# Patient Record
Sex: FEMALE | Race: WHITE | NOT HISPANIC OR LATINO | Employment: FULL TIME | URBAN - METROPOLITAN AREA
[De-identification: names, ages, dates, MRNs, and addresses within clinical notes are randomized per-mention and may not be internally consistent; named-entity substitution may affect disease eponyms.]

---

## 2017-02-03 ENCOUNTER — OFFICE VISIT - HEALTHEAST (OUTPATIENT)
Dept: INTERNAL MEDICINE | Facility: CLINIC | Age: 42
End: 2017-02-03

## 2017-02-03 DIAGNOSIS — J02.9 SORE THROAT: ICD-10-CM

## 2017-08-14 ENCOUNTER — OFFICE VISIT - HEALTHEAST (OUTPATIENT)
Dept: INTERNAL MEDICINE | Facility: CLINIC | Age: 42
End: 2017-08-14

## 2017-08-14 DIAGNOSIS — E55.9 VITAMIN D DEFICIENCY: ICD-10-CM

## 2017-08-14 DIAGNOSIS — Z12.4 CERVICAL CANCER SCREENING: ICD-10-CM

## 2017-08-14 DIAGNOSIS — N92.0 MENORRHAGIA: ICD-10-CM

## 2017-08-14 DIAGNOSIS — Z00.00 ANNUAL PHYSICAL EXAM: ICD-10-CM

## 2017-08-14 DIAGNOSIS — F41.9 ANXIETY: ICD-10-CM

## 2017-08-14 ASSESSMENT — MIFFLIN-ST. JEOR: SCORE: 1178.96

## 2017-08-21 LAB
BKR LAB AP ABNORMAL BLEEDING: NO
BKR LAB AP BIRTH CONTROL/HORMONES: NORMAL
BKR LAB AP CERVICAL APPEARANCE: NORMAL
BKR LAB AP GYN ADEQUACY: NORMAL
BKR LAB AP GYN INTERPRETATION: NORMAL
BKR LAB AP HPV REFLEX: NORMAL
BKR LAB AP LMP: NORMAL
BKR LAB AP PATIENT STATUS: NORMAL
BKR LAB AP PREVIOUS ABNORMAL: NORMAL
BKR LAB AP PREVIOUS NORMAL: 2012
HIGH RISK?: NO
HPV INTERPRETATION - HISTORICAL: NORMAL
HPV INTERPRETER - HISTORICAL: NORMAL
PATH REPORT.COMMENTS IMP SPEC: NORMAL
RESULT FLAG (HE HISTORICAL CONVERSION): NORMAL

## 2017-10-21 ENCOUNTER — COMMUNICATION - HEALTHEAST (OUTPATIENT)
Dept: INTERNAL MEDICINE | Facility: CLINIC | Age: 42
End: 2017-10-21

## 2017-10-23 ENCOUNTER — COMMUNICATION - HEALTHEAST (OUTPATIENT)
Dept: INTERNAL MEDICINE | Facility: CLINIC | Age: 42
End: 2017-10-23

## 2018-01-16 ENCOUNTER — COMMUNICATION - HEALTHEAST (OUTPATIENT)
Dept: INTERNAL MEDICINE | Facility: CLINIC | Age: 43
End: 2018-01-16

## 2018-01-16 DIAGNOSIS — F41.9 ANXIETY: ICD-10-CM

## 2018-07-09 ENCOUNTER — COMMUNICATION - HEALTHEAST (OUTPATIENT)
Dept: INTERNAL MEDICINE | Facility: CLINIC | Age: 43
End: 2018-07-09

## 2018-08-06 ENCOUNTER — OFFICE VISIT - HEALTHEAST (OUTPATIENT)
Dept: INTERNAL MEDICINE | Facility: CLINIC | Age: 43
End: 2018-08-06

## 2018-08-06 DIAGNOSIS — J02.9 PHARYNGITIS: ICD-10-CM

## 2018-08-06 LAB
DEPRECATED S PYO AG THROAT QL EIA: NORMAL
MONOCYTES NFR BLD AUTO: NEGATIVE %

## 2018-08-06 ASSESSMENT — MIFFLIN-ST. JEOR: SCORE: 1172.72

## 2018-08-07 LAB — GROUP A STREP BY PCR: NORMAL

## 2018-08-08 ENCOUNTER — COMMUNICATION - HEALTHEAST (OUTPATIENT)
Dept: INTERNAL MEDICINE | Facility: CLINIC | Age: 43
End: 2018-08-08

## 2018-08-27 ENCOUNTER — COMMUNICATION - HEALTHEAST (OUTPATIENT)
Dept: INTERNAL MEDICINE | Facility: CLINIC | Age: 43
End: 2018-08-27

## 2018-08-27 DIAGNOSIS — F41.9 ANXIETY: ICD-10-CM

## 2018-09-01 ENCOUNTER — COMMUNICATION - HEALTHEAST (OUTPATIENT)
Dept: INTERNAL MEDICINE | Facility: CLINIC | Age: 43
End: 2018-09-01

## 2018-09-03 ENCOUNTER — COMMUNICATION - HEALTHEAST (OUTPATIENT)
Dept: INTERNAL MEDICINE | Facility: CLINIC | Age: 43
End: 2018-09-03

## 2019-04-11 ENCOUNTER — COMMUNICATION - HEALTHEAST (OUTPATIENT)
Dept: INTERNAL MEDICINE | Facility: CLINIC | Age: 44
End: 2019-04-11

## 2019-04-11 DIAGNOSIS — F41.9 ANXIETY: ICD-10-CM

## 2019-05-07 ENCOUNTER — RECORDS - HEALTHEAST (OUTPATIENT)
Dept: ADMINISTRATIVE | Facility: OTHER | Age: 44
End: 2019-05-07

## 2019-05-08 ENCOUNTER — OFFICE VISIT - HEALTHEAST (OUTPATIENT)
Dept: INTERNAL MEDICINE | Facility: CLINIC | Age: 44
End: 2019-05-08

## 2019-05-08 DIAGNOSIS — Z30.09 ENCOUNTER FOR OTHER GENERAL COUNSELING OR ADVICE ON CONTRACEPTION: ICD-10-CM

## 2019-05-08 DIAGNOSIS — F32.81 PMDD (PREMENSTRUAL DYSPHORIC DISORDER): ICD-10-CM

## 2019-05-08 ASSESSMENT — MIFFLIN-ST. JEOR: SCORE: 1196.87

## 2019-05-28 ENCOUNTER — COMMUNICATION - HEALTHEAST (OUTPATIENT)
Dept: ADMINISTRATIVE | Facility: CLINIC | Age: 44
End: 2019-05-28

## 2019-05-28 ENCOUNTER — AMBULATORY - HEALTHEAST (OUTPATIENT)
Dept: MIDWIFE SERVICES | Facility: CLINIC | Age: 44
End: 2019-05-28

## 2019-05-28 DIAGNOSIS — Z01.812 PRE-PROCEDURE LAB EXAM: ICD-10-CM

## 2019-06-07 ENCOUNTER — AMBULATORY - HEALTHEAST (OUTPATIENT)
Dept: LAB | Facility: CLINIC | Age: 44
End: 2019-06-07

## 2019-06-07 ENCOUNTER — OFFICE VISIT - HEALTHEAST (OUTPATIENT)
Dept: MIDWIFE SERVICES | Facility: CLINIC | Age: 44
End: 2019-06-07

## 2019-06-07 DIAGNOSIS — Z01.812 PRE-PROCEDURE LAB EXAM: ICD-10-CM

## 2019-06-07 DIAGNOSIS — Z30.430 ENCOUNTER FOR IUD INSERTION: ICD-10-CM

## 2019-06-07 DIAGNOSIS — Z30.09 ENCOUNTER FOR OTHER GENERAL COUNSELING OR ADVICE ON CONTRACEPTION: ICD-10-CM

## 2019-06-07 LAB — HCG UR QL: NEGATIVE

## 2019-06-07 ASSESSMENT — MIFFLIN-ST. JEOR: SCORE: 1195.97

## 2019-06-23 ENCOUNTER — COMMUNICATION - HEALTHEAST (OUTPATIENT)
Dept: INTERNAL MEDICINE | Facility: CLINIC | Age: 44
End: 2019-06-23

## 2019-06-24 ENCOUNTER — COMMUNICATION - HEALTHEAST (OUTPATIENT)
Dept: ADMINISTRATIVE | Facility: CLINIC | Age: 44
End: 2019-06-24

## 2019-06-24 DIAGNOSIS — N93.9 VAGINAL BLEEDING: ICD-10-CM

## 2019-06-25 ENCOUNTER — COMMUNICATION - HEALTHEAST (OUTPATIENT)
Dept: MIDWIFE SERVICES | Facility: CLINIC | Age: 44
End: 2019-06-25

## 2019-06-26 ENCOUNTER — COMMUNICATION - HEALTHEAST (OUTPATIENT)
Dept: OBGYN | Facility: CLINIC | Age: 44
End: 2019-06-26

## 2019-07-01 ENCOUNTER — COMMUNICATION - HEALTHEAST (OUTPATIENT)
Dept: OBGYN | Facility: CLINIC | Age: 44
End: 2019-07-01

## 2019-07-16 ENCOUNTER — COMMUNICATION - HEALTHEAST (OUTPATIENT)
Dept: MIDWIFE SERVICES | Facility: CLINIC | Age: 44
End: 2019-07-16

## 2019-10-14 ENCOUNTER — COMMUNICATION - HEALTHEAST (OUTPATIENT)
Dept: INTERNAL MEDICINE | Facility: CLINIC | Age: 44
End: 2019-10-14

## 2019-10-14 DIAGNOSIS — F41.9 ANXIETY: ICD-10-CM

## 2020-01-03 ENCOUNTER — COMMUNICATION - HEALTHEAST (OUTPATIENT)
Dept: INTERNAL MEDICINE | Facility: CLINIC | Age: 45
End: 2020-01-03

## 2020-01-03 ENCOUNTER — OFFICE VISIT - HEALTHEAST (OUTPATIENT)
Dept: INTERNAL MEDICINE | Facility: CLINIC | Age: 45
End: 2020-01-03

## 2020-01-03 DIAGNOSIS — F34.0 CYCLOTHYMIC DISORDER: ICD-10-CM

## 2020-01-03 DIAGNOSIS — Z23 NEED FOR VACCINATION: ICD-10-CM

## 2020-01-03 DIAGNOSIS — R53.83 FATIGUE, UNSPECIFIED TYPE: ICD-10-CM

## 2020-01-03 LAB
ANION GAP SERPL CALCULATED.3IONS-SCNC: 8 MMOL/L (ref 5–18)
BASOPHILS # BLD AUTO: 0 THOU/UL (ref 0–0.2)
BASOPHILS NFR BLD AUTO: 0 % (ref 0–2)
BUN SERPL-MCNC: 12 MG/DL (ref 8–22)
CALCIUM SERPL-MCNC: 9.2 MG/DL (ref 8.5–10.5)
CHLORIDE BLD-SCNC: 108 MMOL/L (ref 98–107)
CO2 SERPL-SCNC: 26 MMOL/L (ref 22–31)
CREAT SERPL-MCNC: 0.7 MG/DL (ref 0.6–1.1)
EOSINOPHIL # BLD AUTO: 0.1 THOU/UL (ref 0–0.4)
EOSINOPHIL NFR BLD AUTO: 2 % (ref 0–6)
ERYTHROCYTE [DISTWIDTH] IN BLOOD BY AUTOMATED COUNT: 10.6 % (ref 11–14.5)
GFR SERPL CREATININE-BSD FRML MDRD: >60 ML/MIN/1.73M2
GLUCOSE BLD-MCNC: 86 MG/DL (ref 70–125)
HCT VFR BLD AUTO: 39.9 % (ref 35–47)
HGB BLD-MCNC: 13.4 G/DL (ref 12–16)
LYMPHOCYTES # BLD AUTO: 1.6 THOU/UL (ref 0.8–4.4)
LYMPHOCYTES NFR BLD AUTO: 28 % (ref 20–40)
MCH RBC QN AUTO: 31.4 PG (ref 27–34)
MCHC RBC AUTO-ENTMCNC: 33.6 G/DL (ref 32–36)
MCV RBC AUTO: 94 FL (ref 80–100)
MONOCYTES # BLD AUTO: 0.4 THOU/UL (ref 0–0.9)
MONOCYTES NFR BLD AUTO: 6 % (ref 2–10)
NEUTROPHILS # BLD AUTO: 3.7 THOU/UL (ref 2–7.7)
NEUTROPHILS NFR BLD AUTO: 64 % (ref 50–70)
PLATELET # BLD AUTO: 201 THOU/UL (ref 140–440)
PMV BLD AUTO: 6.9 FL (ref 7–10)
POTASSIUM BLD-SCNC: 4.2 MMOL/L (ref 3.5–5)
RBC # BLD AUTO: 4.26 MILL/UL (ref 3.8–5.4)
SODIUM SERPL-SCNC: 142 MMOL/L (ref 136–145)
TSH SERPL DL<=0.005 MIU/L-ACNC: 0.91 UIU/ML (ref 0.3–5)
WBC: 5.8 THOU/UL (ref 4–11)

## 2020-01-03 ASSESSMENT — ANXIETY QUESTIONNAIRES
IF YOU CHECKED OFF ANY PROBLEMS ON THIS QUESTIONNAIRE, HOW DIFFICULT HAVE THESE PROBLEMS MADE IT FOR YOU TO DO YOUR WORK, TAKE CARE OF THINGS AT HOME, OR GET ALONG WITH OTHER PEOPLE: SOMEWHAT DIFFICULT
4. TROUBLE RELAXING: NEARLY EVERY DAY
5. BEING SO RESTLESS THAT IT IS HARD TO SIT STILL: MORE THAN HALF THE DAYS
7. FEELING AFRAID AS IF SOMETHING AWFUL MIGHT HAPPEN: SEVERAL DAYS
3. WORRYING TOO MUCH ABOUT DIFFERENT THINGS: SEVERAL DAYS
1. FEELING NERVOUS, ANXIOUS, OR ON EDGE: MORE THAN HALF THE DAYS
GAD7 TOTAL SCORE: 11
GAD7 TOTAL SCORE: 11
7. FEELING AFRAID AS IF SOMETHING AWFUL MIGHT HAPPEN: SEVERAL DAYS
2. NOT BEING ABLE TO STOP OR CONTROL WORRYING: SEVERAL DAYS
1. FEELING NERVOUS, ANXIOUS, OR ON EDGE: MORE THAN HALF THE DAYS
6. BECOMING EASILY ANNOYED OR IRRITABLE: SEVERAL DAYS
6. BECOMING EASILY ANNOYED OR IRRITABLE: SEVERAL DAYS
4. TROUBLE RELAXING: NEARLY EVERY DAY
5. BEING SO RESTLESS THAT IT IS HARD TO SIT STILL: MORE THAN HALF THE DAYS
3. WORRYING TOO MUCH ABOUT DIFFERENT THINGS: SEVERAL DAYS
2. NOT BEING ABLE TO STOP OR CONTROL WORRYING: SEVERAL DAYS

## 2020-01-03 ASSESSMENT — MIFFLIN-ST. JEOR: SCORE: 1201.92

## 2020-01-03 ASSESSMENT — PATIENT HEALTH QUESTIONNAIRE - PHQ9: SUM OF ALL RESPONSES TO PHQ QUESTIONS 1-9: 5

## 2020-01-20 ENCOUNTER — COMMUNICATION - HEALTHEAST (OUTPATIENT)
Dept: INTERNAL MEDICINE | Facility: CLINIC | Age: 45
End: 2020-01-20

## 2020-01-27 ENCOUNTER — COMMUNICATION - HEALTHEAST (OUTPATIENT)
Dept: INTERNAL MEDICINE | Facility: CLINIC | Age: 45
End: 2020-01-27

## 2020-01-27 DIAGNOSIS — F41.9 ANXIETY: ICD-10-CM

## 2020-07-01 ENCOUNTER — COMMUNICATION - HEALTHEAST (OUTPATIENT)
Dept: INTERNAL MEDICINE | Facility: CLINIC | Age: 45
End: 2020-07-01

## 2020-07-01 DIAGNOSIS — F41.9 ANXIETY: ICD-10-CM

## 2020-07-22 ENCOUNTER — OFFICE VISIT - HEALTHEAST (OUTPATIENT)
Dept: INTERNAL MEDICINE | Facility: CLINIC | Age: 45
End: 2020-07-22

## 2020-07-22 DIAGNOSIS — F31.81 MILD HYPOMANIC BIPOLAR II DISORDER (H): ICD-10-CM

## 2020-07-22 DIAGNOSIS — Z12.31 ENCOUNTER FOR SCREENING MAMMOGRAM FOR BREAST CANCER: ICD-10-CM

## 2020-07-22 RX ORDER — BUSPIRONE HYDROCHLORIDE 7.5 MG/1
7.5 TABLET ORAL PRN
Status: SHIPPED | COMMUNITY
Start: 2020-07-22 | End: 2022-05-26

## 2020-10-09 ENCOUNTER — RECORDS - HEALTHEAST (OUTPATIENT)
Dept: MAMMOGRAPHY | Facility: CLINIC | Age: 45
End: 2020-10-09

## 2020-10-09 DIAGNOSIS — Z12.31 ENCOUNTER FOR SCREENING MAMMOGRAM FOR MALIGNANT NEOPLASM OF BREAST: ICD-10-CM

## 2020-12-29 ENCOUNTER — COMMUNICATION - HEALTHEAST (OUTPATIENT)
Dept: INTERNAL MEDICINE | Facility: CLINIC | Age: 45
End: 2020-12-29

## 2020-12-29 DIAGNOSIS — F41.9 ANXIETY: ICD-10-CM

## 2020-12-30 RX ORDER — LORAZEPAM 0.5 MG/1
0.5 TABLET ORAL DAILY
Qty: 20 TABLET | Refills: 0 | Status: SHIPPED | OUTPATIENT
Start: 2020-12-30 | End: 2021-09-14

## 2021-01-04 ENCOUNTER — COMMUNICATION - HEALTHEAST (OUTPATIENT)
Dept: INTERNAL MEDICINE | Facility: CLINIC | Age: 46
End: 2021-01-04

## 2021-01-04 DIAGNOSIS — F31.81 MILD HYPOMANIC BIPOLAR II DISORDER (H): ICD-10-CM

## 2021-01-05 RX ORDER — TRAZODONE HYDROCHLORIDE 50 MG/1
TABLET, FILM COATED ORAL
Qty: 90 TABLET | Refills: 1 | Status: SHIPPED | OUTPATIENT
Start: 2021-01-05 | End: 2021-07-13

## 2021-03-11 ENCOUNTER — COMMUNICATION - HEALTHEAST (OUTPATIENT)
Dept: INTERNAL MEDICINE | Facility: CLINIC | Age: 46
End: 2021-03-11

## 2021-03-11 DIAGNOSIS — F31.81 MILD HYPOMANIC BIPOLAR II DISORDER (H): ICD-10-CM

## 2021-03-15 ENCOUNTER — AMBULATORY - HEALTHEAST (OUTPATIENT)
Dept: LAB | Facility: CLINIC | Age: 46
End: 2021-03-15

## 2021-03-15 DIAGNOSIS — F34.1 PERSISTENT DEPRESSIVE DISORDER: ICD-10-CM

## 2021-03-17 ENCOUNTER — AMBULATORY - HEALTHEAST (OUTPATIENT)
Dept: LAB | Facility: CLINIC | Age: 46
End: 2021-03-17

## 2021-03-17 DIAGNOSIS — F31.81 MILD HYPOMANIC BIPOLAR II DISORDER (H): ICD-10-CM

## 2021-03-17 DIAGNOSIS — F34.1 PERSISTENT DEPRESSIVE DISORDER: ICD-10-CM

## 2021-03-17 LAB
ALBUMIN SERPL-MCNC: 4 G/DL (ref 3.5–5)
ALP SERPL-CCNC: 61 U/L (ref 45–120)
ALT SERPL W P-5'-P-CCNC: 10 U/L (ref 0–45)
AST SERPL W P-5'-P-CCNC: 16 U/L (ref 0–40)
BASOPHILS # BLD AUTO: 0 THOU/UL (ref 0–0.2)
BASOPHILS NFR BLD AUTO: 0 % (ref 0–2)
BILIRUB DIRECT SERPL-MCNC: 0.2 MG/DL
BILIRUB SERPL-MCNC: 0.8 MG/DL (ref 0–1)
CHOLEST SERPL-MCNC: 195 MG/DL
EOSINOPHIL # BLD AUTO: 0.1 THOU/UL (ref 0–0.4)
EOSINOPHIL NFR BLD AUTO: 2 % (ref 0–6)
ERYTHROCYTE [DISTWIDTH] IN BLOOD BY AUTOMATED COUNT: 12.6 % (ref 11–14.5)
FASTING STATUS PATIENT QL REPORTED: YES
HCT VFR BLD AUTO: 39.8 % (ref 35–47)
HDLC SERPL-MCNC: 60 MG/DL
HGB BLD-MCNC: 12.9 G/DL (ref 12–16)
IMM GRANULOCYTES # BLD: 0 THOU/UL
IMM GRANULOCYTES NFR BLD: 0 %
LDLC SERPL CALC-MCNC: 122 MG/DL
LYMPHOCYTES # BLD AUTO: 1.8 THOU/UL (ref 0.8–4.4)
LYMPHOCYTES NFR BLD AUTO: 34 % (ref 20–40)
MCH RBC QN AUTO: 30.6 PG (ref 27–34)
MCHC RBC AUTO-ENTMCNC: 32.4 G/DL (ref 32–36)
MCV RBC AUTO: 94 FL (ref 80–100)
MONOCYTES # BLD AUTO: 0.5 THOU/UL (ref 0–0.9)
MONOCYTES NFR BLD AUTO: 9 % (ref 2–10)
NEUTROPHILS # BLD AUTO: 3 THOU/UL (ref 2–7.7)
NEUTROPHILS NFR BLD AUTO: 55 % (ref 50–70)
PLATELET # BLD AUTO: 220 THOU/UL (ref 140–440)
PMV BLD AUTO: 8.8 FL (ref 7–10)
PROT SERPL-MCNC: 6.5 G/DL (ref 6–8)
RBC # BLD AUTO: 4.22 MILL/UL (ref 3.8–5.4)
TRIGL SERPL-MCNC: 67 MG/DL
WBC: 5.4 THOU/UL (ref 4–11)

## 2021-03-19 LAB — LAMOTRIGINE SERPL-MCNC: 2.3 UG/ML (ref 2.5–15)

## 2021-03-22 ENCOUNTER — COMMUNICATION - HEALTHEAST (OUTPATIENT)
Dept: LAB | Facility: CLINIC | Age: 46
End: 2021-03-22

## 2021-04-02 ENCOUNTER — RECORDS - HEALTHEAST (OUTPATIENT)
Dept: ADMINISTRATIVE | Facility: OTHER | Age: 46
End: 2021-04-02

## 2021-05-27 ASSESSMENT — PATIENT HEALTH QUESTIONNAIRE - PHQ9: SUM OF ALL RESPONSES TO PHQ QUESTIONS 1-9: 5

## 2021-05-27 NOTE — TELEPHONE ENCOUNTER
Controlled Substance Refill Request  Medication:   Requested Prescriptions     Pending Prescriptions Disp Refills     LORazepam (ATIVAN) 0.5 MG tablet 20 tablet 0     Sig: Take 1 tablet (0.5 mg total) by mouth daily.     Date Last Fill: 8/29/18  [id number]   Submit electronically to pharmacy  Controlled Substance Agreement on File:   Encounter-Level CSA Scan Date:    There are no encounter-level csa scan date.       Last office visit: Last office visit pertaining to requested medication was 8/6/18.

## 2021-05-28 ASSESSMENT — ANXIETY QUESTIONNAIRES: GAD7 TOTAL SCORE: 11

## 2021-05-28 NOTE — PROGRESS NOTES
[facility] Follow Up Note    Assessment/Plan:    1. Encounter for other general counseling or advice on contraception  Patient is being .  She has a new sexual partner.  Both of them have been tested for STDs and negative.  In the past she had fertility problems and had fertility treatments however is hard to  if she might be more fertile with a new sexual partner.  Initially she was interested in nonhormonal birth control and we discussed copper IUD.  She was also interested in progesterone only IUD.  We will refer her to midwife to further address that.  She did have normal Pap smear negative HPV in 2017.  - Ambulatory referral to Midwifery    2.  Anxiety and depression.  Currently patient has been in better spirits and has been exercising and seeing therapist which has been controlling her symptoms.  She uses lorazepam infrequently for very extremely stressful situations only.    Susan Guerra MD    Chief Complaint:  Chief Complaint   Patient presents with     Annual Exam       History of Present Illness:    Rangel is a 44 y.o. female with history of anxiety and depression, PMS and possible PCOS who is currently here to discuss birth control.    Patient is currently in the process of divorce and has a news partner.  In the past she did have difficulty conceiving her both children and did require fertility drugs as well as metformin.  She has not been using birth control when sexually active with her  and has not gotten pregnant.  Currently she has new sexual partner and is more sexually active.  They both discussed tested for STDs prior to onset of the relationship.  Her periods have been fairly regular and flow has been moderate.  Overall it is hard to predict what her fertility status is giving a new sexual partner.  Patient was interested in nonhormonal treatments would be mechanical and copper IUD.  We also talked about progesterone only IUD which might be a good solution  "for her because it will be good for 5 years and then she can have it removed at 49.  She was interested in seeing midwife to further address this.    Mood has been well.  Patient has been exercising a lot and seeing a therapist and denies worsening depressive symptoms.  If anything process of divorce has been positively impacted her.  She does use lorazepam occasionally as needed for very stressful situations but has not been using it consistently.    Review of Systems:  A comprehensive review of systems was performed and was otherwise negative    PFSH:  Social History: Reviewed  Social History     Tobacco Use   Smoking Status Never Smoker   Smokeless Tobacco Never Used     Social History     Social History Narrative    Patient is  but is having some marital problems and currently is going to do couples therapy.  She works as an  for sixth grade.  She has 2 children born in 2006 and 2009.       Past History: Reviewed  Current Outpatient Medications   Medication Sig Dispense Refill     LORazepam (ATIVAN) 0.5 MG tablet Take 1 tablet (0.5 mg total) by mouth daily. 20 tablet 0     No current facility-administered medications for this visit.        Family History: Reviewed    Physical Exam:    Vitals:    05/08/19 1538   BP: 104/60   Patient Site: Left Arm   Patient Position: Sitting   Cuff Size: Adult Regular   Pulse: 60   SpO2: 100%   Weight: 125 lb 3.2 oz (56.8 kg)   Height: 5' 4.25\" (1.632 m)     Wt Readings from Last 3 Encounters:   05/08/19 125 lb 3.2 oz (56.8 kg)   08/06/18 119 lb (54 kg)   08/14/17 120 lb 6 oz (54.6 kg)     Body mass index is 21.32 kg/m .    Constitutional:  Reveals a pleasant young female.  Vitals:  Per nursing notes.  HEENT:No cervical LAD, no thyromegaly,  conjunctiva is pink, no scleral icterus, TMs are visualized and normal bl, oropharynx is clear, no exudates,   Cardiac:  Regular rate and rhythm,no murmurs, rubs, or gallops. Carotids without bruits. Legs without edema. " Palpation of the radial pulse regular.  Lungs: Clear to auscultation bl.  Respiratory effort normal.  Abdomen:positive BS, soft, nontender, nondistended.  No hepato-splenomagaly  Skin:   Without rash, bruise, or palpable lesions.  Rheumatologic: Normal joints and nails of the hands.  Neurologic:  Cranial nerves II-XII intact.     Psychiatric: affect appropriate, memory intact.       Data Review:    Analysis and Summary of Old Records (2): yes      Records Requested (1): no      Other History Summarized (from other people in the room) (2): no    Radiology Tests Summarized (XRAY/CT/MRI/DXA) (1): no    Labs Reviewed (1): yes    Medicine Tests Reviewed (EKG/ECHO/COLONOSCOPY/EGD) (1): no    Independent Review of EKG or X-RAY (2): no

## 2021-05-29 ENCOUNTER — RECORDS - HEALTHEAST (OUTPATIENT)
Dept: ADMINISTRATIVE | Facility: CLINIC | Age: 46
End: 2021-05-29

## 2021-05-29 NOTE — TELEPHONE ENCOUNTER
Telephone Call with on Call CNM    Assessment:   Mirena IUD in place  Heavy period    Plan:  -Reviewed bleeding profile of Mirena IUD.  Discussed IUD is likely causing a heavier than normal period  -Reviewed possible malposition of IUD as cause of heavy bleeding  -Recommend checking strings.  If longer or shorter than previously noted or no strings palpated, clinic visit and US are indicated  -Discussed options: close monitoring of bleeding, clinic visit or ultrasound  -Recommend confirming placement with ultrasound for reassurance in addition to close monitoring of bleeding.  Order placed at [facility] per pt preference  -Recommend not relying on IUD as contraception until location confirmed  -Warning signs reviewed and when to call/seek emergent care.     Subjective:  Mirena IUD placed June 7th.  Spotted for 4-5  days.  Then stopped for 2 days.  Period started, spotting x 2 days.  Then medium to heavy bleeding x 9 days.  Filling menstrual cup in 4 hours, which is typical for her heaviest day of her period.   Mild cramping.  Burnett x 1 since placement; no discomfort.  Feels that yesterday, she would have soaked a pad in an hour.  Today there is less bleeding~ a tablespoon in Diva cup about every 3 hours.   She has not felt the strings since the first week; will plan to do so.  Denies fever, abdominal pain, dizziness.    Typical periods are 7 days in duration--light days, medium days, and 2 heavier days.    She will monitor her bleeding and also plan to have ultrasound in a few days.    Ying OBREGON, SUPRIYA

## 2021-05-29 NOTE — TELEPHONE ENCOUNTER
Name of caller: Patient  Phone number where you may be reached: [phone]  Reason for call: Pt had IUD placed on 6/7 and had a few days of light spotting but since then has had very heavy, prolonged bleeding. Pt thinks it's about 9 days straight. She would like a call back from a midwife to discuss if she needs to be seen in clinic.  Best time to call back: any  If we don't reach you, is it okay to leave a detailed message? yes

## 2021-05-29 NOTE — PATIENT INSTRUCTIONS - HE
no method of birth control, except abstinence, is 100% effective against pregnancy or kana sexually transmitted diseases, including Human Immunodeficiency Virus (HIV) infection that leads to the Acquired Immunodeficiency Syndrome (AIDS) disease.     BENEFITS: The IUD is 97-99% effective if all the directions regarding its use are followed carfeully. It can be more effective if used with foam or condoms mid-cycle between periods. IUDs containing progestin may decrease menstrual flow and painful menstrual periods. The IUD provides longer protection from pregnancy (Paragard- 10 years; Mirena - 5 years)   RISKS/ SIDE EFFECTS   1. Spotting, bleeding, hemorrhage or anemia   2. Cramping or pain   3. Partial or complete expulsion of device leading to pregnancy, the pregnancy ending in miscarriage   4. Lost IUD string or other string problems   5. Puncturing of the uterus, embedding, or cervical perforation   6. Increased risk of pelvic inflammatory disease   WARNING SIGNS: The patient was advised to call the clinic if she has any of the following early warning signs:   P - Period late (pregnancy), abnormal spotting or bleeding   A - Abdominal pain, pain with intercourse   I - Infection exposure (such as gonorrhea), abnormal discharge   N - Not feeling well, fever, chills   S - String missing, shorter or longer   Check for the IUD strings several times during the first few months after insertion and then after each monthly period or before intercourse.   DECISION TO DISCONTINUE USE: You may have the IUD removed at any time. Do not try to remove the device- it should be removed only by a medical provider. If you do not desire to become pregnant, you may request to have another IUD inserted or choose to use another method of birth control.

## 2021-05-29 NOTE — PROGRESS NOTES
ASSESSMENT:      44 y.o.,   Routine Mirena IUD insertion.         PLAN:     1.  Reviewed, witnessed and signed IUD insertion consent form and reviewed alternative options for BCM available. Patient desires Mirena IUD insertion today.   2. Reassurance re: correct placement, normalcy of side effects and that these often lessen with extended use of IUD.   3.  Recommended taking ibuprofen 600 mg to reduce amount and duration of bleeding.    4.  Taught and encouraged to check IUD strings monthly.    5.  Warning signs related to IUD use (PAINS) and when to call CNM reviewed  including to call with any fever, severe back pain, severe abdominal pain, heavy bleeding (soaking more than 1 pad per hour)  6. Return to clinic in 4 weeks for IUD string check, sooner as needed.         SUBJECTIVE:      Rangel Salamanca is a 44 y.o. female who presents for IUD insertion. The patient has no complaints today. The patient is sexually active.     HPI/ROS:   Rangel Salamanca is a 44 y.o. female who presents for IUD insert. LMP 19.  History of infertility, but would like effective contraception as not wanting to risk pregnancy.     Urine pregnancy test was performed in clinic and was negative. Undecided initially about IUD type: has mood disorder which was worsened by , but also has a history of heavy menstrual bleeding. Pt education included mechanism of action, benefits and drawbacks of Mirena and Paragard IUDs. The risks and benefits of the procedure were explained as below to the patient and informed consent was obtained and consent form signed.  Rangel would like to proceed with Mireuna IUD insertion.  Rangel was given an opportunity to ask questions about all forms of birth control, meaning all prescriptions, non-prescription, and natural methods. All of her questions were answered to her satisfaction and she understood all of those answers. She understands that no method of birth control, except abstinence, is  100% effective against pregnancy or kana sexually transmitted diseases, including Human Immunodeficiency Virus (HIV) infection that leads to the Acquired Immunodeficiency Syndrome (AIDS) disease. The following benefits, risk/side effects, warning signs, control method, intrauterine decisions to discontinue use option, regarding the birth control method, intrauterine device, were explained to her before she voluntarily decided to use this method of birth control.   BENEFITS: The IUD is 97-99% effective if all the directions regarding its use are followed carfeully. It can be more effective if used with foam or condoms mid-cycle between periods. IUDs containing progestin may decrease menstrual flow and painful menstrual periods. The IUD provides longer protection from pregnancy (Paragard- 10 years; Mirena - 5 years)   RISKS/ SIDE EFFECTS   1. Spotting, bleeding, hemorrhage or anemia   2. Cramping or pain   3. Partial or complete expulsion of device leading to pregnancy, the pregnancy ending in miscarriage   4. Lost IUD string or other string problems   5. Puncturing of the uterus, embedding, or cervical perforation   6. Increased risk of pelvic inflammatory disease   WARNING SIGNS: The patient was advised to call the clinic if she has any of the following early warning signs:   P - Period late (pregnancy), abnormal spotting or bleeding   A - Abdominal pain, pain with intercourse   I - Infection exposure (such as gonorrhea), abnormal discharge   N - Not feeling well, fever, chills   S - String missing, shorter or longer   ALTERNATIVES: She received written information about other methods of birth control and she chose the IUD. She understands that she should check for the IUD strings several times during the first few months after insertion and then after each monthly period or before intercourse.   DECISION TO DISCONTINUE USE: She understands that she may have the IUD removed at any time. She knows not try to  remove the device and that it should be removed only by a medical provider. If she does not desire to become pregnant, she has been told she may request to have another IUD inserted or choose to use another method of birth control. If she wishes to become pregnant, she understands most women not using birth control become pregnant within 12 months.    Review of Systems  Pertinent items are noted in HPI.     OBJECTIVE:     Vitals:    06/07/19 1441   BP: 108/62   Pulse: 68       IUD Insertion Procedure Note    Pre-operative Diagnosis: Desires Mirena IUD Contraception    Post-operative Diagnosis: same    Indications: Contraception    Procedure Details   Bimanual exam performed revealing an anteverted uterus, midline, non-tender, negative CMT. Cervix is multiparous, long, thick, closed. Sterile speculum placed. GC/CT not collected as patient reports recent negative testing and does not want to repeat today- understands collecting can help prevent infection and resulting complications.  Cervix and vaginal walls cleansed with Betadine. Tenaculum placed on cervix at 11 o'clock and 2 o'clock. Uterus sounded to 8 cm. IUD inserted without difficulty. String visible and trimmed to 3 cm. Tenaculum removed. Minimal bleeding noted, well controlled with brennan swabs. Patient tolerated procedure well. Did not have any periods of syncope, sat up and ambulated with ease.     IUD Information:  Mirena.  Lot # [id number]  Exp 4/2021    Condition:  Stable    Complications:  None        Total time with patient 40 min > 50% time spent in counseling or coordination of care.    Fadia Ortiz, APRN, CNM, CLC

## 2021-05-30 VITALS — BODY MASS INDEX: 20.4 KG/M2 | WEIGHT: 120.7 LBS

## 2021-05-30 NOTE — TELEPHONE ENCOUNTER
Telephone Call with on Call CNM  Reviewed ultrasound results from 6/25 with patient.  IUD is in the correct place in the uterus, which is reassuring.  Also reviewed the finding of a fibroid in the uterus.    Her bleeding today is lighter than it has been.  Wearing a panitliner.    Encouraged to follow up for 4-6 week string check and call CNM service as needed.    SABAS Luis, CNM

## 2021-05-30 NOTE — TELEPHONE ENCOUNTER
"PHONE NOTE: Patient called CN on-call with complaint of heavy bleeding with IUD.  Patient had her IUD placed on 6/7/2019.  Then she started her period on 6/14/2019.  She had fairly heavy bleeding and then it has continued.  She states out of 4 stars she is now up to so it has eased up a little bit.  But she is concerned that she is on day 17 of bleeding today.  I recommended 600 mg of ibuprofen  every 6 hours for 3 days.  If the bleeding does not improve, she should call back.  Patient had an ultrasound and the imaging shows that the IUD is in proper placement.  Patient is not feeling dizzy or lightheaded.  She states \"I will eat high iron food, some meat, and take a high iron vitamin \".  Patient knows when to call/come in and has phone numbers.  "

## 2021-05-30 NOTE — TELEPHONE ENCOUNTER
"TELEPHONE CALL:  Called patient and LVM.  I see that she is on my schedule for this afternoon due to concerns of \"prolonged bleeding\" with Mirena that was placed on 6/7.  Appt notes also state \"replace if needed.\"  I explained on VM that most beneficial would be having the ultrasound before appt, as I would not pull IUD unless part of it was visually seen.  I would wait for u/s to verify whether it was in the correct position or not.    "

## 2021-05-31 ENCOUNTER — RECORDS - HEALTHEAST (OUTPATIENT)
Dept: ADMINISTRATIVE | Facility: CLINIC | Age: 46
End: 2021-05-31

## 2021-05-31 VITALS — BODY MASS INDEX: 20.06 KG/M2 | WEIGHT: 120.38 LBS | HEIGHT: 65 IN

## 2021-06-01 VITALS — WEIGHT: 119 LBS | HEIGHT: 65 IN | BODY MASS INDEX: 19.83 KG/M2

## 2021-06-02 NOTE — TELEPHONE ENCOUNTER
Controlled Substance Refill Request  Medication:   Requested Prescriptions     Pending Prescriptions Disp Refills     LORazepam (ATIVAN) 0.5 MG tablet 20 tablet 0     Sig: Take 1 tablet (0.5 mg total) by mouth daily.     Date Last Fill: 4.12.19  Pharmacy: [facility]   Submit electronically to pharmacy  Controlled Substance Agreement on File:   Encounter-Level CSA Scan Date:    There are no encounter-level csa scan date.       Last office visit: Last office visit pertaining to requested medication was 5.8.19.

## 2021-06-03 VITALS — HEIGHT: 64 IN | BODY MASS INDEX: 21.34 KG/M2 | WEIGHT: 125 LBS

## 2021-06-03 VITALS — BODY MASS INDEX: 21.37 KG/M2 | WEIGHT: 125.2 LBS | HEIGHT: 64 IN

## 2021-06-04 VITALS
BODY MASS INDEX: 20.9 KG/M2 | SYSTOLIC BLOOD PRESSURE: 92 MMHG | DIASTOLIC BLOOD PRESSURE: 64 MMHG | WEIGHT: 125.44 LBS | OXYGEN SATURATION: 98 % | HEART RATE: 60 BPM | HEIGHT: 65 IN

## 2021-06-04 NOTE — PROGRESS NOTES
[facility] Follow Up Note    Assessment/Plan:    1. Cyclothymic disorder  Patient does have cycling of depressive symptoms and hypomania.  No full-blown bipolar disorder.  Cyclothymic diagnosis was given to her by psychiatrist 2 years ago.  She did not start on Lamictal as recommended at that time.  Currently she has lorazepam but does not want to use it too often.  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  She anticipates that with resuming of school next week her symptoms will get better.  Currently discussed that she can take Lamictal at bedtime as needed.  Since she does have symptoms of depression 10 or more days a month I think starting on Lamictal might be helpful but I asked her to wait until she is in a more depressed mood before starting it.  Also recommended that she schedules a follow-up with her psychiatrist.  - QUEtiapine (SEROQUEL) 100 MG tablet; 1-2 tabs at bedtime as needed  Dispense: 60 tablet; Refill: 0  - lamoTRIgine (LAMICTAL) 25 MG tablet; Take one tab by mouth daily for 2 weeks, then 2 tabs for 2 weeks, then 4 tabs daily  Dispense: 120 tablet; Refill: 2  - HM1(CBC and Differential)  - Thyroid Stimulating Hormone (TSH)  - Basic Metabolic Panel  - HM1 (CBC with Diff)    2. Fatigue, unspecified type  - HM1(CBC and Differential)  - Thyroid Stimulating Hormone (TSH)  - Basic Metabolic Panel  - HM1 (CBC with Diff)    3. Need for vaccination  - Td, Preservative Free (green label)      Susan Guerra MD    Chief Complaint:  Chief Complaint   Patient presents with     Annual Exam     Medication Management       History of Present Illness:  Rangel is a 44 y.o. female  with history of anxiety and depression, PMS and possible PCOS who is currently here to discuss  symptoms and treatment for cyclothymia.    I last saw the patient over a year ago and at that time she was going through divorce and has had some depression and anxiety.  She has been using lorazepam very infrequently.  Since then she reports that she did see a psychiatrist who gave her diagnosis of cyclothymia since she did have fluctuating symptoms of being hyper and being depressed.  He did offer to try Lamictal but patient did not want to do it.  She works as a teacher and normally she does well do do it during school time but when there is a vacation no break and days unstructured her mood is all over the place.    Currently she complains about somewhat hyper and having problems sleeping.  She has been using shot of whiskey to help her sleep.  She has not been overusing lorazepam but on average takes it 3 times a week.  She does get about 6-7 hours a night of sleep.  Melatonin has not been helping.  On PHQ 9 her mood currently is good however based on her mood tracker it appears that 10 to 14 days a month she does feel more depressed.  She has been exercising but it does not help her sleep.  Overall her mood cycles weekly.    Her new boyfriend currently also has bipolar disorder but is on medications.  She currently has IUD for birth control.  Before they started their relationship they both had STD screenings.    Patient is amiable to get her tetanus and flu shot today.    Review of Systems:  A comprehensive review of systems was performed and was otherwise negative    PFSH:  Social History: Reviewed  Social History     Tobacco Use   Smoking Status Never Smoker   Smokeless Tobacco Never Used     Social History     Social History Narrative    Patient is  but is having some marital problems and currently is going to do couples therapy.  She works as an  for sixth grade.  She has 2 children born in 2006 and 2009.       Past History: Viewed  Current Outpatient Medications   Medication Sig  "Dispense Refill     LORazepam (ATIVAN) 0.5 MG tablet Take 1 tablet (0.5 mg total) by mouth daily. 20 tablet 0     lamoTRIgine (LAMICTAL) 25 MG tablet Take one tab by mouth daily for 2 weeks, then 2 tabs for 2 weeks, then 4 tabs daily 120 tablet 2     QUEtiapine (SEROQUEL) 100 MG tablet 1-2 tabs at bedtime as needed 60 tablet 0     No current facility-administered medications for this visit.        Family History: Viewed    Physical Exam:    Vitals:    01/03/20 1226   BP: 92/64   Patient Site: Left Arm   Patient Position: Sitting   Cuff Size: Adult Regular   Pulse: 60   SpO2: 98%   Weight: 125 lb 7 oz (56.9 kg)   Height: 5' 4.5\" (1.638 m)     Wt Readings from Last 3 Encounters:   01/03/20 125 lb 7 oz (56.9 kg)   06/07/19 125 lb (56.7 kg)   05/08/19 125 lb 3.2 oz (56.8 kg)     Body mass index is 21.2 kg/m .    Constitutional:  Reveals a pleasant young female.  Vitals:  Per nursing notes.  HEENT:No cervical LAD, no thyromegaly,  conjunctiva is pink, no scleral icterus, TMs are visualized and normal bl, oropharynx is clear, no exudates,   Cardiac:  Regular rate and rhythm,no murmurs, rubs, or gallops. Carotids without bruits. Legs without edema. Palpation of the radial pulse regular.  Lungs: Clear to auscultation bl.  Respiratory effort normal.  Abdomen:positive BS, soft, nontender, nondistended.  No hepato-splenomagaly  Skin:   Without rash, bruise, or palpable lesions.  Rheumatologic: Normal joints and nails of the hands.  Neurologic:  Cranial nerves II-XII intact.     Psychiatric: affect appropriate, memory intact.  No flight of ideas or pressured speech.  Affect is slightly flat, good concentration.    Data Review:    Analysis and Summary of Old Records (2): yes      Records Requested (1): no      Other History Summarized (from other people in the room) (2): no    Radiology Tests Summarized (XRAY/CT/MRI/DXA) (1): no    Labs Reviewed (1): yes    Medicine Tests Reviewed (EKG/ECHO/COLONOSCOPY/EGD) (1): " no    Independent Review of EKG or X-RAY (2): no

## 2021-06-04 NOTE — PATIENT INSTRUCTIONS - HE
1. Try Seroquel at bed time to help with sleep as needed.  Start Lamictal to help stabilize mood and prevent depression.   Follow up with psychiatrist.

## 2021-06-05 NOTE — TELEPHONE ENCOUNTER
Medication: Lorazepam  Last Date Filled 10/17/19   pulled: YES    Only PCP Prescribing? : YES  Taken as prescribed from physician notes? YES    CSA in last year: YES  Random Utox in last year: NO  (if any of the above answer NO - schedule with PCP)     Opioids + benzodiazepines? YES  (if the above answer YES - schedule with PCP every 6 months)     Is patient on the Executive Review Team? no      All responses suggest: Refilling prescription

## 2021-06-05 NOTE — TELEPHONE ENCOUNTER
Controlled Substance Refill Request  Medication Name:   Requested Prescriptions     Pending Prescriptions Disp Refills     LORazepam (ATIVAN) 0.5 MG tablet 20 tablet 0     Sig: Take 1 tablet (0.5 mg total) by mouth daily.     Date Last Fill: 10/17/19  Requested Pharmacy: [facility]  Submit electronically to pharmacy  Controlled Substance Agreement on file:   Encounter-Level CSA Scan Date:    There are no encounter-level csa scan date.        Last office visit:  1/3/20

## 2021-06-08 NOTE — PROGRESS NOTES
[facility] Follow Up Note    Assessment/Plan:  1. Sore throat-strep pharyngitis  Recommendations: Fluids, Tylenol for aches and pains.  Amoxicillin 875 twice a day for 10 days.  - Rapid Strep A Screen-Throat        Heavenly Moser MD    Chief Complaint:  Chief Complaint   Patient presents with     Sore Throat     Ear Pain       History of Present Illness:  Rangel is a 41 y.o. female who is here today for evaluation of severe left ear pain and a sore throat.  She states that she developed a viral URI about 2-1/2 weeks ago.  It consisted of a cough, congestion, aches and pains.  The symptoms went away and she was starting to feel better.  Today she awoke with severe left ear and throat pain.  She is here for an evaluation.  She denies any nausea or vomiting.  She denies any high fever.  She denies any skin rashes.  She is a .  She has young children at home that have had respiratory viruses.    Review of Systems:  A comprehensive review of systems was performed and was otherwise negative    PFSH:  Social History: She is a .  She is a nonsmoker  History   Smoking Status     Never Smoker   Smokeless Tobacco     Never Used       Past History: Unremarkable  Current Outpatient Prescriptions   Medication Sig Dispense Refill     clindamycin-benzoyl peroxide (BENZACLIN) gel Apply and gently massage into affected area(s). 25 g 0     amoxicillin (AMOXIL) 875 MG tablet Take 1 tablet (875 mg total) by mouth 2 (two) times a day for 10 days. 20 tablet 0     No current facility-administered medications for this visit.        Family History: No one with strep at home    Physical Exam:  General Appearance:   She appears mildly uncomfortable  Vitals:    02/03/17 0838   BP: 100/56   Patient Site: Left Arm   Patient Position: Sitting   Cuff Size: Adult Regular   Pulse: 66   Temp: 98.4  F (36.9  C)   TempSrc: Tympanic   Weight: 120 lb 11.2 oz (54.7 kg)     Wt Readings from Last 3  Encounters:   02/03/17 120 lb 11.2 oz (54.7 kg)   03/28/16 117 lb (53.1 kg)   08/05/15 115 lb 11.2 oz (52.5 kg)     Body mass index is 20.4 kg/(m^2).    Head and neck exam is performed.  Pupils are equal and react to light.  Ears are intact bilaterally.  Sinuses and nose are unremarkable.  Throat reveals mild erythema  Neck is palpated.  There is very tender anterior lymph node  Lungs are clear to auscultation and percussion  Cardiac exam reveals regular rate and rhythm with no murmurs or gallops  Abdomen is soft and nontender with no splenomegaly

## 2021-06-09 NOTE — TELEPHONE ENCOUNTER
Medication: Lorazepam  Last Date Filled 1/29/20   pulled: YES    Only PCP Prescribing? : YES  Taken as prescribed from physician notes? YES    CSA in last year: YES  Random Utox in last year: NO  (if any of the above answer NO - schedule with PCP)     Opioids + benzodiazepines? YES  (if the above answer YES - schedule with PCP every 6 months)     Is patient on the Executive Review Team? no      All responses suggest: Refilling prescription

## 2021-06-09 NOTE — PROGRESS NOTES
Patient would like to be contacted via the following phone number [phone]  ASSESSMENT:    1. Mild hypomanic bipolar II disorder (H)  Patient tried Seroquel and Lamictal together and developed twitching and stopped them.  Currently she is anxious about side effect from this medications.  She feels that she is able to control her sleep her hypomanic symptoms during the day are well managed.  She does see a psychiatrist but reports is hard to get in with him.  Most recently she was given prescription for trazodone and uses it as needed as well as Benadryl.  She does get prescription for lorazepam for me.  She only uses it twice a week or less for extreme anxiety and hyperactivity.  She also has prescription for buspirone but denies significant effect from that.  - traZODone (DESYREL) 50 MG tablet; 1-2 tbs at bedtime as needed  Dispense: 30 tablet; Refill: 0    2. Encounter for screening mammogram for breast cancer  - Mammo Screening Bilateral; Future        CHIEF COMPLAINT:  Chief Complaint   Patient presents with     Follow-up       HISTORY OF PRESENT ILLNESS:  Rangel is a 45 y.o. female contacting the clinic today via phone for follow-up.  Rangel has h/o  with history of anxiety and depression, PMS and possible PCOS.    I last saw her in January.  At that time due to hypomania and cycling depression I gave her prescription for Seroquel and Lamictal.  She started them all at once and developed twitching, got anxious and discontinued.  Since then she was able to see her psychiatrist.  He gave her trazodone to use at bedtime to improve her sleep.  She uses it as needed.  She also gets prescription for lorazepam from me but only uses it twice a week or less for anxiety and intense hyperactivity.    REVIEW OF SYSTEMS:   All other systems are negative.    PFSH:  Social History     Social History Narrative    Patient is  but is having some marital problems and currently is going to do couples therapy.  She works  "as an  for sixth grade.  She has 2 children born in 2006 and 2009.       TOBACCO USE:  Social History     Tobacco Use   Smoking Status Never Smoker   Smokeless Tobacco Never Used       VITALS:  There were no vitals filed for this visit.  Wt Readings from Last 3 Encounters:   01/03/20 125 lb 7 oz (56.9 kg)   06/07/19 125 lb (56.7 kg)   05/08/19 125 lb 3.2 oz (56.8 kg)       PHYSICAL EXAM:  (observations via Phone)  Pleasant female, no acute distress, awake alert and oriented x3, no flight of ideas or pressured speech.  Thought processes linear.      ADDITIONAL HISTORY SUMMARIZED (2): Chart reviewed  CARE EVERYWHERE/ EXTRA INFORMATION (1):   RADIOLOGY TESTS (1): No  LABS (1): No  CARDIOLOGY/MEDICINE TESTS (1): No  INDEPENDENT REVIEW (2 each):     Total data points: 2    Phone Start time: 3:24 PM  Phone End time: 3:34 PM    The visit lasted a total of 10 minutes     CA Intake Time: 4 min    I have reviewed and updated the patient's Past Medical History, Social History, Family History as appropriate.    MEDICATIONS: Reviewed and updated per CA and MD  Current Outpatient Medications   Medication Sig Dispense Refill     lamoTRIgine (LAMICTAL) 25 MG tablet Take one tab by mouth daily for 2 weeks, then 2 tabs for 2 weeks, then 4 tabs daily 120 tablet 2     LORazepam (ATIVAN) 0.5 MG tablet Take 1 tablet (0.5 mg total) by mouth daily. 20 tablet 0     QUEtiapine (SEROQUEL) 100 MG tablet 1-2 tabs at bedtime as needed 60 tablet 0     No current facility-administered medications for this visit.        The patient has been notified of following:     \"This telephone visit will be conducted via a call between you and your physician/provider. We have found that certain health care needs can be provided without the need for a physical exam.  This service lets us provide the care you need with a short phone conversation.  If a prescription is necessary we can send it directly to your pharmacy.  If lab work is needed we " "can place an order for that and you can then stop by our lab to have the test done at a later time.    Telephone visits are billed at different rates depending on your insurance coverage. During this emergency period, for some insurers they may be billed the same as an in-person visit.  Please reach out to your insurance provider with any questions.    If during the course of the call the physician/provider feels a telephone visit is not appropriate, you will not be charged for this service.\"    Patient has given verbal consent to a Telephone visit? Yes    What phone number would you like to be contacted at? [phone]    Patient would like to receive their AVS by AVS Preference: [place].      Susan Guerra MD       "

## 2021-06-09 NOTE — TELEPHONE ENCOUNTER
Controlled Substance Refill Request  Medication Name:   Requested Prescriptions     Pending Prescriptions Disp Refills     LORazepam (ATIVAN) 0.5 MG tablet 20 tablet 0     Sig: Take 1 tablet (0.5 mg total) by mouth daily.     Date Last Fill: 1/29/20  Requested Pharmacy: [facility]  Submit electronically to pharmacy  Controlled Substance Agreement on file:   Encounter-Level CSA Scan Date:    There are no encounter-level csa scan date.        Last office visit:  1/3/20

## 2021-06-12 NOTE — PROGRESS NOTES
[facility] Follow Up Note    Assessment/Plan:    1. Annual physical exam  Pap smear was obtained today.  Patient will come back for fasting blood work.  She had normal mammogram last year and we discussed that we can repeat it in a year or 2.  Patient will get flu shot in October.  - Gynecologic Cytology (PAP Smear)     2. Anxiety  Lorazepam was refilled.  She uses it infrequently (last year 14 tablets lasted her almost a year).  Discussed if symptoms get worse we can try Prozac.  - Thyroid Stimulating Hormone (TSH); Future  - LORazepam (ATIVAN) 0.5 MG tablet; Take 1 tablet (0.5 mg total) by mouth daily.  Dispense: 20 tablet; Refill: 0    3. Menorrhagia  We will screen for anemia.  - HM1(CBC and Differential); Future  - Iron and Transferrin Iron Binding Capacity; Future  - Thyroid Stimulating Hormone (TSH); Future    4. Vitamin D deficiency  - Vitamin D, Total (25-Hydroxy); Future    Susan Guerra MD    Chief Complaint:  Chief Complaint   Patient presents with     Establish Care     Change from Dr Chung     Annual Exam       History of Present Illness:  Rangel is a 42 y.o. female with history of anxiety and occasional depression, PMS, questionable PCO S who is currently here to establish care and have physical exam done.    Patient has long-standing history of anxiety and occasional depression.  Currently she is seeing therapist and also does yoga and exercise to help with symptoms.  She had some marital problems and is going into couples therapy at the moment.  In the past there are several antidepressants that were tried: Wellbutrin caused insomnia, citalopram help with anxiety but make her very tired, Effexor made her suicidal.  Currently she is requesting prescription for lorazepam.  She did bring her previous bottle of 14 tablets of Lorazepam which was ordered in February 2016 by Dr. Chung and she still has 1 pill left.  She is aware that if she takes this medication regular rate could be  addictive but she just wants to have a few tablets on hold.   was reviewed.    Patient was also diagnosed with PCO S due to history of infertility.  She did not have any regular symptoms of PCO S and was menstruating but could not get pregnant.  Her fertility doctor put her on metformin and Clomid and since then she has had 2 children.  Currently her menstruations are regular but menstrual cycle is shorter and she does have increased menstrual bleeding.  She denies any severe abdominal cramping or pain with intercourse.  She is due for Pap smear today.  She denies any vaginal itching or discharge.  No history of abnormal Pap smears in the past.  She declined STD screening.    Review of Systems:  A comprehensive review of systems was performed and was otherwise negative.  Patient denies any panic attacks.  Occasionally sleep is interrupted.  No history of asthma shortness of breath.  She runs and does yoga for exercise and denies any dizziness, shortness of breath or chest pains.  No abdominal complaints.  No urinary complaints.    PFSH:  Social History: Reviewed  History   Smoking Status     Never Smoker   Smokeless Tobacco     Never Used     Social History     Social History Narrative    Patient is  but is having some marital problems and currently is going to do couples therapy.  She works as an  for sixth grade.  She has 2 children born in 2006 and 2009.       Past History: Reviewed  Current Outpatient Prescriptions   Medication Sig Dispense Refill     clindamycin-benzoyl peroxide (BENZACLIN) gel Apply and gently massage into affected area(s). 25 g 0     LORazepam (ATIVAN) 0.5 MG tablet Take 1 tablet (0.5 mg total) by mouth daily. 20 tablet 0     No current facility-administered medications for this visit.        Family History: Reviewed    Physical Exam:    Vitals:    08/14/17 1206   BP: 98/60   Patient Site: Left Arm   Patient Position: Sitting   Cuff Size: Adult Regular   Pulse: 68  "  Resp: 16   SpO2: 99%   Weight: 120 lb 6 oz (54.6 kg)   Height: 5' 4.5\" (1.638 m)     Wt Readings from Last 3 Encounters:   08/14/17 120 lb 6 oz (54.6 kg)   02/03/17 120 lb 11.2 oz (54.7 kg)   03/28/16 117 lb (53.1 kg)     Body mass index is 20.34 kg/(m^2).    Constitutional:  Reveals a pleasant young female.  Vitals:  Per nursing notes.  HEENT:No cervical LAD, no thyromegaly,  conjunctiva is pink, no scleral icterus, TMs are visualized and normal bl, oropharynx is clear, no exudates,   Cardiac:  Regular rate and rhythm,no murmurs, rubs, or gallops.  Legs without edema. Palpation of the radial pulse regular.  Lungs: Clear to auscultation bl.  Respiratory effort normal.  Abdomen:positive BS, soft, nontender, nondistended.  No hepato-splenomagaly  Skin:   Without rash, bruise, or palpable lesions.  Rheumatologic: Normal joints and nails of the hands.  Psychiatric: affect appropriate, memory intact.   Breast exam: No axillary lymphadenopathy, breast masses or skin changes appreciated.  Breast is slightly fibrocystic.  Gyn: normal external genitalia, cervix with slight transitional zone irregularity, but no polyps or friability, no masses on bymanual exam.    Data Review:    Analysis and Summary of Old Records (2): Yes    Records Requested (1): No    Other History Summarized (from other people in the room) (2): No    Radiology Tests Summarized (XRAY/CT/MRI/DXA) (1): No    Labs Reviewed (1): Yes    Medicine Tests Reviewed (EKG/ECHO/COLONOSCOPY/EGD) (1): No    Independent Review of EKG or X-RAY (2): No      "

## 2021-06-14 NOTE — TELEPHONE ENCOUNTER
Prescription Monitoring Program activity reviewed with no discrepancies noted.      Last fill per : 7/20/20  Quantity/days supply: 20 tablets for 20 days     Controlled Substance Agreement on file: Yes  Date: 1/30/20    Last office visit with provider:  1/3/2020    Please advise.

## 2021-06-14 NOTE — TELEPHONE ENCOUNTER
Refill Approved    Rx renewed per Medication Renewal Policy. Medication was last renewed on 20.    Veronica Gallo, [facility] Triage/Med Refill 2021     Requested Prescriptions   Pending Prescriptions Disp Refills     traZODone (DESYREL) 50 MG tablet 30 tablet 0     Si-2 tbs at bedtime as needed       Tricyclics/Misc Antidepressant/Antianxiety Meds Refill Protocol Passed - 2021  4:11 PM        Passed - PCP or prescribing provider visit in last year     Last office visit with prescriber/PCP: 2018 Susan Guerra MD OR same dept: Visit date not found OR same specialty: 2018 Susan Guerra MD  Last physical: 1/3/2020 Last MTM visit: Visit date not found   Next visit within 3 mo: Visit date not found  Next physical within 3 mo: Visit date not found  Prescriber OR PCP: Susan Guerra MD  Last diagnosis associated with med order: 1. Mild hypomanic bipolar II disorder (H)  - traZODone (DESYREL) 50 MG tablet; 1-2 tbs at bedtime as needed  Dispense: 30 tablet; Refill: 0    If protocol passes may refill for 12 months if within 3 months of last provider visit (or a total of 15 months).

## 2021-06-14 NOTE — TELEPHONE ENCOUNTER
Controlled Substance Refill Request  Medication Name:   Requested Prescriptions     Pending Prescriptions Disp Refills     LORazepam (ATIVAN) 0.5 MG tablet 20 tablet 0     Sig: Take 1 tablet (0.5 mg total) by mouth daily.     Date Last Fill: 7/7/20  [facility]  Submit electronically to pharmacy  Controlled Substance Agreement on file:   Encounter-Level CSA Scan Date:    There are no encounter-level csa scan date.        Last office visit:  7/22/20

## 2021-06-15 NOTE — TELEPHONE ENCOUNTER
Received a message from Rangel that she would like a lab appointment to monitor limotrigine levels. Please enter orders.

## 2021-06-16 PROBLEM — Z30.430 ENCOUNTER FOR IUD INSERTION: Status: ACTIVE | Noted: 2019-06-07

## 2021-06-16 PROBLEM — Z30.09 ENCOUNTER FOR OTHER GENERAL COUNSELING OR ADVICE ON CONTRACEPTION: Status: ACTIVE | Noted: 2019-06-07

## 2021-06-16 PROBLEM — D25.9 FIBROID UTERUS: Status: ACTIVE | Noted: 2019-06-26

## 2021-06-16 PROBLEM — F31.81: Status: ACTIVE | Noted: 2018-08-06

## 2021-06-16 NOTE — TELEPHONE ENCOUNTER
Telephone Encounter by Court Gaona CMA at 1/28/2020 12:56 PM     Author: Court Gaona CMA Service: -- Author Type: Certified Medical Assistant    Filed: 1/28/2020 12:58 PM Encounter Date: 1/27/2020 Status: Signed    : Court Gaona CMA (Certified Medical Assistant)

## 2021-06-16 NOTE — TELEPHONE ENCOUNTER
Telephone Encounter by Estela Garza CMA at 4/12/2019  2:45 PM     Author: Estela Garza CMA Service: -- Author Type: Certified Medical Assistant    Filed: 4/12/2019  2:51 PM Encounter Date: 4/11/2019 Status: Signed    : Estela Garza CMA (Certified Medical Assistant)       Medication: lorazepam  Last Date Filled: 8/29/2018   pulled: YES            Only PCP Prescribing? : YES  Taken as prescribed from physician notes? YES    CSA in last year: N/A  Random Utox in last year: N/A    Your note dated 8/14/2017 you stated the following :     2. Anxiety  Lorazepam was refilled.  She uses it infrequently (last year 14 tablets lasted her almost a year).  Discussed if symptoms get worse we can try Prozac.  - Thyroid Stimulating Hormone (TSH); Future  - LORazepam (ATIVAN) 0.5 MG tablet; Take 1 tablet (0.5 mg total) by mouth daily.  Dispense: 20 tablet; Refill: 0      (if any of the above answer NO - schedule with PCP)     Opioids + benzodiazepines? NO  (if the above answer YES - schedule with PCP every 6 months)     All responses suggest: Refilling prescription

## 2021-06-16 NOTE — TELEPHONE ENCOUNTER
Telephone Encounter by Odilia Arndt LPN at 10/17/2019  9:17 AM     Author: Odilia Arndt LPN Service: -- Author Type: Licensed Nurse    Filed: 10/17/2019  9:21 AM Encounter Date: 10/14/2019 Status: Signed    : Odilia Arndt LPN (Licensed Nurse)       Medication: Lorazepam  Last Date Filled 4/12/19   pulled: YES         Only PCP Prescribing? : YES  Taken as prescribed from physician notes? YES OV 5/8/19    2.  Anxiety and depression.  Currently patient has been in better spirits and has been exercising and seeing therapist which has been controlling her symptoms.  She uses lorazepam infrequently for very extremely stressful situations only.       CSA in last year: NO  Random Utox in last year: NO  (if any of the above answer NO - schedule with PCP)     Opioids + benzodiazepines? NO  (if the above answer YES - schedule with PCP every 6 months)     Is patient on the Executive Review Team? No      All responses suggest: Refilling prescription

## 2021-06-16 NOTE — TELEPHONE ENCOUNTER
Pt already had labs done on March 17th.  Please see why she is coming back. If psychiatrist wants mre labs, he/she should order it.     Previously she requested that labs are faxed to her psychiatrist: Fax labs to [phone] [facility].

## 2021-06-17 NOTE — TELEPHONE ENCOUNTER
Telephone Encounter by Court Gaona CMA at 7/2/2020 10:21 AM     Author: Court Gaona CMA Service: -- Author Type: Certified Medical Assistant    Filed: 7/2/2020 10:23 AM Encounter Date: 7/1/2020 Status: Signed    : Court Gaona CMA (Certified Medical Assistant)

## 2021-06-19 NOTE — PROGRESS NOTES
[facility] Follow Up Note    Assessment/Plan:    1. Pharyngitis  Rapid strep test was negative.  Will check a mono screen as well.  If both are negative I would empirically treat her for strep.  But will await final results.  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  - Rapid Strep A Screen-Throat  - Mononucleosis Screen  - Group A Strep, RNA Direct Detection, Throat      Susan Guerra MD    Chief Complaint:  Chief Complaint   Patient presents with     Sore Throat       History of Present Illness:  Rangel is a 43 y.o. female with history of anxiety and occasional depression, PMS, questionable PCO S who is currently here for acute visit due to sore throat.    She had sore throat for 3 days and denies any other symptoms (no postnasal drainage or cough).  She had mild pain in her left ear and increased fatigue.  She went camping last Wednesday with a friend but he was not sick.  Currently she is concerned because there is mild white exudate on her tonsils bilaterally.    Review of Systems:  A comprehensive review of systems was performed and was otherwise negative.  Patient is currently followed by psychiatrist and was diagnosed with his cyclothymic disorder.  She did go on Prozac for treatment of depression which gave her josee.  Currently she is followed with his therapist and was started on Lamictal by psychiatrist but has not started taking it yet.  Melatonin has been helpful for her to regulate her sleep.    PFSH:  Social History: Reviewed  History   Smoking Status     Never Smoker   Smokeless Tobacco     Never Used     Social History     Social History Narrative    Patient is  but is having some marital problems and currently is going to do couples therapy.  She works as an  for sixth grade.  She has 2 children born in 2006 and 2009.       Past History: Reviewed  Current Outpatient Prescriptions   Medication Sig Dispense Refill     LORazepam  "(ATIVAN) 0.5 MG tablet Take 1 tablet (0.5 mg total) by mouth daily. 20 tablet 0     No current facility-administered medications for this visit.        Family History: Reviewed    Physical Exam:    Vitals:    08/06/18 1113   BP: 92/60   Patient Site: Left Arm   Patient Position: Sitting   Cuff Size: Adult Regular   Pulse: (!) 58   Resp: 16   Temp: 99.1  F (37.3  C)   TempSrc: Tympanic   SpO2: 97%   Weight: 119 lb (54 kg)   Height: 5' 4.5\" (1.638 m)     Wt Readings from Last 3 Encounters:   08/06/18 119 lb (54 kg)   08/14/17 120 lb 6 oz (54.6 kg)   02/03/17 120 lb 11.2 oz (54.7 kg)     Body mass index is 20.11 kg/(m^2).    Constitutional:  Reveals a pleasant young female.  Vitals:  Per nursing notes.  HEENT: Mild anterior cervical cervical LAD, no thyromegaly,  conjunctiva is pink, no scleral icterus, TMs are visualized and normal bl, oropharynx is clear, no exudates,   Cardiac:  Regular rate and rhythm,no murmurs, rubs, or gallops.  Legs without edema. Palpation of the radial pulse regular.  Lungs: Clear to auscultation bl.  Respiratory effort normal.  No wheezes or rales  Abdomen:positive BS, soft, nontender, nondistended.  Mild splenomagaly  Skin:   Without rash, bruise, or palpable lesions.  Rheumatologic: Normal joints and nails of the hands.  Neurologic:  Cranial nerves II-XII intact.     Psychiatric: affect appropriate, memory intact.     Data Review:    Analysis and Summary of Old Records (2): Yes    Records Requested (1): No    Other History Summarized (from other people in the room) (2): No    Radiology Tests Summarized (XRAY/CT/MRI/DXA) (1): No    Labs Reviewed (1): No    Medicine Tests Reviewed (EKG/ECHO/COLONOSCOPY/EGD) (1): No    Independent Review of EKG or X-RAY (2): No    "

## 2021-06-20 NOTE — LETTER
Letter by Susan Guerra MD at      Author: Susan Guerra MD Service: -- Author Type: --    Filed:  Encounter Date: 1/3/2020 Status: Signed         [facility]  01/03/20    Patient: Rangel Salamanca  YOB: 1975  Medical Record Number: [id number]  CSN: [id number]                                                                              Non-opioid Controlled Substance Agreement    I understand that my care provider has prescribed a controlled substance to help manage my condition(s). I am taking this medicine to help me function or work. I know this is strong medicine, and that it can cause serious side effects. Controlled substances can be sedating, addicting and may cause a dependency on the drug. They can affect my ability to drive or think, and cause depression. They need to be taken exactly as prescribed. Combining controlled substances with certain medicines or chemicals (such as cocaine, sedatives and tranquilizers, sleeping pills, meth) can be dangerous or even fatal. Also, if I stop controlled substances suddenly, I may have severe withdrawal symptoms.  If not helpful, I may be asked to stop them.    The risks, benefits, and side effects of these medicine(s) were explained to me. I agree that:    1. I will take part in other treatments as advised by my care team. This may be psychiatry or counseling, physical therapy, behavioral therapy, group treatment or a referral to a pain clinic. I will reduce or stop my medicine when my care team tells me to do so.  2. I will take my medicines as prescribed. I will not change the dose or schedule unless my care team tells me to. There will be no refills if I run out early.  I may be contactedwithout warning and asked to complete a urine drug test or pill count at any time.   3. I will keep all my appointments, and understand this is part of the monitoring of controlled substances. My care team may require an office visit for EVERY  controlled substance refill. If I miss appointments or dont follow instructions, my care team may stop my medicine.  4. I will not ask other providers to prescribe controlled substances, and I will not accept controlled substances from other people. If I need another prescribed controlled substance for a new reason, I will tell my care team within 1 business day.  5. I will use one pharmacy to fill all of my controlled substance prescriptions, and it is up to me to make sure that I do not run out of my medicines on weekends or holidays. If my care team is willing to refill my controlled substance prescription without a visit, I must request refills only during office hours, refills may take up to 3 days to process, and it may take up to 5 to 7 days for my medicine to be mailed and ready at my pharmacy. Prescriptions will not be mailed anywhere except my pharmacy.    6. I am responsible for my prescriptions. If the medicine/prescription is lost or stolen, it will not be replaced. I also agree not to share controlled substance medicines with anyone.          [facility] INTER[facility]IC[facility]  01/03/20  Patient:  Rangel Salamanca  YOB: 1975  Medical Record Number: [id number]  CSN: [id number]    7. I agree to not use ANY illegal or recreational drugs. This includes marijuana, cocaine, bath salts or other drugs. I agree not to use alcohol unless my care team says I may. I agree to give urine samples whenever asked. If I dont give a urine sample, the care team may stop my medicine.    8. If I enroll in the [place] Medical Marijuana program, I will tell my care team. I will also sign an agreement to share my medical records with my care team.    9. I will bring in my list of medicines (or my medicine bottles) each time I come to the clinic.   10. I will tell my care team right away if I become pregnant or have a new medical problem treated outside of my regular clinic.  11. I understand that this medicine can affect my  thinking and judgment. It may be unsafe for me to drive, use machinery and do dangerous tasks. I will not do any of these things until I know how the medicine affects me. If my dose changes, I will wait to see how it affects me. I will contact my care team if I have concerns about medicine side effects.    I understand that if I do not follow any of the conditions above, my prescriptions or treatment may be stopped.      I agree that my provider, clinic care team, and pharmacy may work with any city, state or federal law enforcement agency that investigates the misuse, sale, or other diversion of my controlled medicine. I will allow my provider to discuss my care with or share a copy of this agreement with any other treating provider, pharmacy or emergency room where I receive care. I agree to give up (waive) any right of privacy or confidentiality with respect to these consents.   I have read this agreement and have asked questions about anything I did not understand.    ___________________________________________________________________________  Patient signature - Date/Time  -Rangel Salamanca                                      ___________________________________________________________________________  Witness signature                                                                    ___________________________________________________________________________  Provider signature- Susan Guerra MD

## 2021-06-27 ENCOUNTER — HEALTH MAINTENANCE LETTER (OUTPATIENT)
Age: 46
End: 2021-06-27

## 2021-06-30 ENCOUNTER — COMMUNICATION - HEALTHEAST (OUTPATIENT)
Dept: INTERNAL MEDICINE | Facility: CLINIC | Age: 46
End: 2021-06-30

## 2021-06-30 DIAGNOSIS — F31.81 MILD HYPOMANIC BIPOLAR II DISORDER (H): ICD-10-CM

## 2021-06-30 RX ORDER — TRAZODONE HYDROCHLORIDE 50 MG/1
TABLET, FILM COATED ORAL
Qty: 90 TABLET | Refills: 0 | Status: SHIPPED | OUTPATIENT
Start: 2021-06-30 | End: 2024-06-05

## 2021-07-03 NOTE — ADDENDUM NOTE
Addendum Note by Ashley Kang CMA at 7/7/2020  9:26 AM     Author: Ashley Kang CMA Service: -- Author Type: Certified Medical Assistant    Filed: 7/7/2020  9:26 AM Encounter Date: 7/1/2020 Status: Signed    : Ashley Kang CMA (Certified Medical Assistant)    Addended by: ASHLEY KANG on: 7/7/2020 09:26 AM        Modules accepted: Orders

## 2021-07-03 NOTE — ADDENDUM NOTE
Addendum Note by Tyra Guerra MD at 7/7/2020  3:06 PM     Author: Tyra Guerra MD Service: -- Author Type: Physician    Filed: 7/7/2020  3:06 PM Encounter Date: 7/1/2020 Status: Signed    : Tyra Guerra MD (Physician)    Addended by: TYRA GUERRA on: 7/7/2020 03:06 PM        Modules accepted: Orders

## 2021-07-04 NOTE — TELEPHONE ENCOUNTER
Telephone Encounter by Sue Mena at 7/1/2021 10:12 AM     Author: Sue Mena Service: -- Author Type: --    Filed: 7/1/2021 10:12 AM Encounter Date: 6/30/2021 Status: Signed    : Sue Mena       Left FIRST mess VV/telephone appt for med tiffanie

## 2021-07-04 NOTE — TELEPHONE ENCOUNTER
Telephone Encounter by Hoda Mix RN at 2021  7:31 PM     Author: Hoda Mix RN Service: -- Author Type: Registered Nurse    Filed: 2021  7:35 PM Encounter Date: 2021 Status: Signed    : Hoda Mix RN (Registered Nurse)       Refill Approved    Rx renewed per Medication Renewal Policy. Medication was last renewed on 2021 with 1 refill.  Last office visit: Virtual visit 2020 with PCP Dr AMY Guerra.   Due for office visit with PCP: messages sent to clinic and pharmacy.     Hoda Mix, [facility] Triage/Med Refill 2021     Requested Prescriptions   Pending Prescriptions Disp Refills   ? traZODone (DESYREL) 50 MG tablet 90 tablet 1     Si-2 tbs at bedtime as needed       Tricyclics/Misc Antidepressant/Antianxiety Meds Refill Protocol Passed - 2021  4:24 PM        Passed - PCP or prescribing provider visit in last year     Last office visit with prescriber/PCP: 2018 Susan Guerra MD OR same dept: Visit date not found OR same specialty: 2018 Susan Guerra MD  Last physical: 1/3/2020 Last MTM visit: Visit date not found   Next visit within 3 mo: Visit date not found  Next physical within 3 mo: Visit date not found  Prescriber OR PCP: Susan Guerra MD  Last diagnosis associated with med order: 1. Mild hypomanic bipolar II disorder (H)  - traZODone (DESYREL) 50 MG tablet; 1-2 tbs at bedtime as needed  Dispense: 90 tablet; Refill: 1    If protocol passes may refill for 12 months if within 3 months of last provider visit (or a total of 15 months).

## 2021-07-04 NOTE — TELEPHONE ENCOUNTER
Telephone Encounter by Odilia Arndt LPN at 2021  4:23 PM     Author: Odilia Arndt LPN Service: -- Author Type: Licensed Nurse    Filed: 2021  4:24 PM Encounter Date: 2021 Status: Signed    : Odilia Arndt LPN (Licensed Nurse)       Refill request received from [facility] for Trazodone 50 mg    Requested Prescriptions     Pending Prescriptions Disp Refills   ? traZODone (DESYREL) 50 MG tablet 90 tablet 1     Si-2 tbs at bedtime as needed

## 2021-07-05 PROBLEM — F34.0 CYCLOTHYMIC DISORDER: Status: ACTIVE | Noted: 2021-07-02

## 2021-07-05 PROBLEM — F31.81: Status: RESOLVED | Noted: 2018-08-06 | Resolved: 2021-07-02

## 2021-07-13 DIAGNOSIS — F31.81 MILD HYPOMANIC BIPOLAR II DISORDER (H): ICD-10-CM

## 2021-07-14 PROBLEM — F31.81: Status: RESOLVED | Noted: 2018-08-06 | Resolved: 2021-07-02

## 2021-07-16 RX ORDER — TRAZODONE HYDROCHLORIDE 50 MG/1
TABLET, FILM COATED ORAL
Qty: 180 TABLET | Refills: 3 | Status: SHIPPED | OUTPATIENT
Start: 2021-07-16

## 2021-07-16 NOTE — TELEPHONE ENCOUNTER
"Last Written Prescription Date:  1/5/21  Last Fill Quantity: 90,  # refills: 1   Last office visit provider:  7/2/21     Requested Prescriptions   Pending Prescriptions Disp Refills     traZODone (DESYREL) 50 MG tablet 90 tablet 1       Serotonin Modulators Passed - 7/13/2021 10:41 AM        Passed - Recent (12 mo) or future (30 days) visit within the authorizing provider's specialty     Patient has had an office visit with the authorizing provider or a provider within the authorizing providers department within the previous 12 mos or has a future within next 30 days. See \"Patient Info\" tab in inbasket, or \"Choose Columns\" in Meds & Orders section of the refill encounter.              Passed - Medication is active on med list        Passed - Patient is age 18 or older        Passed - No active pregnancy on record        Passed - No positive pregnancy test in past 12 months             Faisal Hassan RN 07/16/21 7:53 AM  "

## 2021-09-14 ENCOUNTER — MYC REFILL (OUTPATIENT)
Dept: INTERNAL MEDICINE | Facility: CLINIC | Age: 46
End: 2021-09-14

## 2021-09-14 DIAGNOSIS — F41.9 ANXIETY: ICD-10-CM

## 2021-09-14 NOTE — TELEPHONE ENCOUNTER
Routing refill request to provider for review/approval because:  Controlled medication refill request.  Routing to provider's care team.    Last Written Prescription Date:  6/30/21  Last Fill Quantity: 90,  # refills: 0   Last office visit provider:  7/2/21       Requested Prescriptions   Pending Prescriptions Disp Refills     LORazepam (ATIVAN) 0.5 MG tablet 20 tablet 0     Sig: Take 1 tablet (0.5 mg) by mouth daily       There is no refill protocol information for this order            Mirela Fuller RN 09/14/21 6:43 PM

## 2021-09-15 RX ORDER — LORAZEPAM 0.5 MG/1
0.5 TABLET ORAL DAILY
Qty: 20 TABLET | Refills: 0 | Status: SHIPPED | OUTPATIENT
Start: 2021-09-15 | End: 2022-01-04

## 2021-09-15 NOTE — TELEPHONE ENCOUNTER
Medication: Lorazepam 0.5 mg  Last Date Filled 21, 20 tablets   pulled: YES         Only PCP Prescribing? : YES  Taken as prescribed from physician notes? YES    CSA in last year:   Random Utox in last year: No  Opioids + benzodiazepines? YES    Is patient on the Executive Review Team? No    Patient was last seen by Dr. Guerra on 21 where use of lorazepam was discussed.     All responses suggest: Refilling prescription

## 2021-10-17 ENCOUNTER — HEALTH MAINTENANCE LETTER (OUTPATIENT)
Age: 46
End: 2021-10-17

## 2021-11-04 ENCOUNTER — VIRTUAL VISIT (OUTPATIENT)
Dept: INTERNAL MEDICINE | Facility: CLINIC | Age: 46
End: 2021-11-04
Payer: COMMERCIAL

## 2021-11-04 VITALS — BODY MASS INDEX: 21.12 KG/M2 | WEIGHT: 125 LBS

## 2021-11-04 DIAGNOSIS — F31.81 MILD HYPOMANIC BIPOLAR II DISORDER (H): Primary | ICD-10-CM

## 2021-11-04 PROCEDURE — 99213 OFFICE O/P EST LOW 20 MIN: CPT | Mod: GT | Performed by: INTERNAL MEDICINE

## 2021-11-04 RX ORDER — OLANZAPINE 5 MG/1
5 TABLET ORAL AT BEDTIME
Qty: 30 TABLET | Refills: 3 | Status: SHIPPED | OUTPATIENT
Start: 2021-11-04 | End: 2022-05-26

## 2021-11-04 NOTE — PROGRESS NOTES
"Rangel is a 46 year old who is being evaluated via a billable video visit.      How would you like to obtain your AVS? Mail a copy  If the video visit is dropped, the invitation should be resent by: [phone]  Will anyone else be joining your video visit? No        Assessment & Plan     Mild hypomanic bipolar II disorder (H)  Currently her depression is controlled on Lamictal 100 mg but she still is experiencing sensation of feeling hyper.  She has been taking trazodone and has been getting regular 7 to 8 hours of sleep.  Currently discussed adding a small dose of Zyprexa to help with his agitation and hyperactivity and stabilize her mood.  Also recommended that she has further medication adjustments from her psychiatrist.  Referrals were provided.  Will need to follow-up in 4 to 6 weeks either with me or with psychiatrist.  - MENTAL HEALTH REFERRAL  - Adult; Psychiatry; Psychiatry; [facility] [phone]; We will contact you to schedule the appointment or please call with any questions; Future  - OLANZapine (ZYPREXA) 5 MG tablet; Take 1 tablet (5 mg) by mouth At Bedtime    Susan Guerra MD  [facility]    Subjective   Rangel is a 46 year old who presents for the following health issues.    HPI     Rangel is a 46 y.o. female with history of anxiety, depression, cyclothymia, PMS, PCOS.  She is currently here for virtual visit due to mental health concerns.    She has had history of cyclothymic disorder and I previously started her on Lamictal.  She did see a psychiatrist before who increased her Lamictal 200 but she has not seen him recently.  Currently her depression is controlled but frequently after work she feels a little \"buzzed\" and hyper.  She does take trazodone for sleep and has been sleeping 7-8 hours at night.  Her significant other did mention that she has been more irritable recently.  In the past Seroquel has given her side effects and twitching.  She has " been cycling her moods faster.  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    Review of Systems   As above, she does not drink alcohol or use any substances      Objective           Vitals:  No vitals were obtained today due to virtual visit.    Physical Exam       Pleasant female, in no acute distress, awake alert and oriented, concentration is good, no flight of ideas or pressured speech.  Thought processes linear.  No shortness of breath, cough or respiratory difficulty noted.    Video-Visit Details    Type of service:  Video Visit  Video visit start time: 4:55 PM  Video End Time: 5:15 PM    Originating Location (pt. Location): Home    Distant Location (provider location):  [facility]     Platform used for Video Visit: [employer]

## 2021-12-12 ENCOUNTER — HEALTH MAINTENANCE LETTER (OUTPATIENT)
Age: 46
End: 2021-12-12

## 2022-01-04 ENCOUNTER — MYC REFILL (OUTPATIENT)
Dept: INTERNAL MEDICINE | Facility: CLINIC | Age: 47
End: 2022-01-04
Payer: COMMERCIAL

## 2022-01-04 DIAGNOSIS — F41.9 ANXIETY: ICD-10-CM

## 2022-01-06 RX ORDER — LORAZEPAM 0.5 MG/1
0.5 TABLET ORAL DAILY
Qty: 20 TABLET | Refills: 0 | Status: SHIPPED | OUTPATIENT
Start: 2022-01-06 | End: 2022-01-10

## 2022-01-07 NOTE — TELEPHONE ENCOUNTER
Routing refill request to provider for review/approval because:  Drug not on the G refill protocol   LORazepam (ATIVAN) 0.5 MG tablet 20 tablet 0 9/15/2021  No   Sig - Route: Take 1 tablet (0.5 mg) by mouth daily - Oral     LAST OV-11/4/21

## 2022-01-10 RX ORDER — LORAZEPAM 0.5 MG/1
0.5 TABLET ORAL DAILY
Qty: 20 TABLET | Refills: 0 | Status: SHIPPED | OUTPATIENT
Start: 2022-01-10 | End: 2022-10-26

## 2022-05-06 DIAGNOSIS — F34.1 DYSTHYMIA: Primary | ICD-10-CM

## 2022-05-26 ENCOUNTER — OFFICE VISIT (OUTPATIENT)
Dept: INTERNAL MEDICINE | Facility: CLINIC | Age: 47
End: 2022-05-26

## 2022-05-26 ENCOUNTER — ANCILLARY PROCEDURE (OUTPATIENT)
Dept: MAMMOGRAPHY | Facility: CLINIC | Age: 47
End: 2022-05-26
Attending: INTERNAL MEDICINE
Payer: COMMERCIAL

## 2022-05-26 VITALS
DIASTOLIC BLOOD PRESSURE: 64 MMHG | OXYGEN SATURATION: 99 % | SYSTOLIC BLOOD PRESSURE: 110 MMHG | HEART RATE: 58 BPM | HEIGHT: 65 IN | WEIGHT: 127.7 LBS | BODY MASS INDEX: 21.27 KG/M2

## 2022-05-26 DIAGNOSIS — Z12.4 CERVICAL CANCER SCREENING: ICD-10-CM

## 2022-05-26 DIAGNOSIS — F34.0 CYCLOTHYMIC DISORDER: ICD-10-CM

## 2022-05-26 DIAGNOSIS — Z12.31 VISIT FOR SCREENING MAMMOGRAM: ICD-10-CM

## 2022-05-26 DIAGNOSIS — Z12.11 SCREEN FOR COLON CANCER: ICD-10-CM

## 2022-05-26 DIAGNOSIS — Z11.59 NEED FOR HEPATITIS C SCREENING TEST: ICD-10-CM

## 2022-05-26 DIAGNOSIS — E55.9 VITAMIN D DEFICIENCY: ICD-10-CM

## 2022-05-26 DIAGNOSIS — L98.9 SKIN LESION: ICD-10-CM

## 2022-05-26 DIAGNOSIS — Z00.00 ANNUAL PHYSICAL EXAM: Primary | ICD-10-CM

## 2022-05-26 PROCEDURE — 87624 HPV HI-RISK TYP POOLED RSLT: CPT | Performed by: INTERNAL MEDICINE

## 2022-05-26 PROCEDURE — 99396 PREV VISIT EST AGE 40-64: CPT | Performed by: INTERNAL MEDICINE

## 2022-05-26 PROCEDURE — 77063 BREAST TOMOSYNTHESIS BI: CPT | Mod: GC | Performed by: STUDENT IN AN ORGANIZED HEALTH CARE EDUCATION/TRAINING PROGRAM

## 2022-05-26 PROCEDURE — 77067 SCR MAMMO BI INCL CAD: CPT | Mod: GC | Performed by: STUDENT IN AN ORGANIZED HEALTH CARE EDUCATION/TRAINING PROGRAM

## 2022-05-26 PROCEDURE — G0123 SCREEN CERV/VAG THIN LAYER: HCPCS | Performed by: INTERNAL MEDICINE

## 2022-05-26 ASSESSMENT — ENCOUNTER SYMPTOMS
BREAST MASS: 0
FREQUENCY: 0
ARTHRALGIAS: 0
PALPITATIONS: 0
HEARTBURN: 0
SORE THROAT: 0
NAUSEA: 0
SHORTNESS OF BREATH: 0
JOINT SWELLING: 0
HEADACHES: 0
MYALGIAS: 0
ABDOMINAL PAIN: 0
DYSURIA: 0
DIZZINESS: 0
HEMATURIA: 0
COUGH: 0
PARESTHESIAS: 0
CHILLS: 0
NERVOUS/ANXIOUS: 0
WEAKNESS: 0
EYE PAIN: 0
FEVER: 0
CONSTIPATION: 0
HEMATOCHEZIA: 0
DIARRHEA: 0

## 2022-05-26 ASSESSMENT — PATIENT HEALTH QUESTIONNAIRE - PHQ9
SUM OF ALL RESPONSES TO PHQ QUESTIONS 1-9: 3
SUM OF ALL RESPONSES TO PHQ QUESTIONS 1-9: 3
10. IF YOU CHECKED OFF ANY PROBLEMS, HOW DIFFICULT HAVE THESE PROBLEMS MADE IT FOR YOU TO DO YOUR WORK, TAKE CARE OF THINGS AT HOME, OR GET ALONG WITH OTHER PEOPLE: NOT DIFFICULT AT ALL

## 2022-05-26 NOTE — PROGRESS NOTES
SUBJECTIVE:   CC: Rangel Salamanca is an 47 year old woman who presents for preventive health visit.     Rangel is a 47 y.o. female with history of anxiety, depression, cyclothymia, PMS, PCOS.  She is currently here for a physical.    She wanted to check a mole on her right calf.  She had it since her use but it has been changing recently.  Does have somewhat irregular borders and currently has changed colors.  She does not have moles in the other places of her body.    She does see a psychiatrist for gnosis of cyclothymic disorder.  She is currently on Lamictal and trazodone.  Previously prescribed Zyprexa before she was able to see psychiatrist and she did not like how it made her feel.  She does lorazepam but infrequently.  She has been sleeping well.  Mood is controlled but she is tired.  She works as a  and is waiting for school year to finish.    She is physically active and does running and yoga.  Denies any chest pains, shortness of breath or change in exercise tolerance.    She has an IUD.  Menstrual bleeding is very rare and light.  She denies any itching of this discharge or new sexual partners.  She is due for Pap smear today.      {Healthy Habits:   PHQ-2 Total Score: 1    Today's PHQ-2 Score:   PHQ-2 ( 1999 [employer]) 5/26/2022   Q1: Little interest or pleasure in doing things 1   Q2: Feeling down, depressed or hopeless 0   PHQ-2 Score 1   Q1: Little interest or pleasure in doing things Several days   Q2: Feeling down, depressed or hopeless Not at all   PHQ-2 Score 1       Social History     Tobacco Use     Smoking status: Never Smoker     Smokeless tobacco: Never Used   Substance Use Topics     Alcohol use: Yes     Alcohol/week: 3.0 - 4.0 standard drinks     Comment: Alcoholic Drinks/day: occasionally       Alcohol Use 5/26/2022   Prescreen: >3 drinks/day or >7 drinks/week? No     Breast Cancer Screening:    FHS-7:   Breast CA Risk Assessment (FHS-7) 5/26/2022   Did any of your  "first-degree relatives have breast or ovarian cancer? Yes   Did any of your relatives have bilateral breast cancer? Unknown   Did any man in your family have breast cancer? No   Did any woman in your family have breast and ovarian cancer? No   Did any woman in your family have breast cancer before age 50 y? No   Do you have 2 or more relatives with breast and/or ovarian cancer? No   Do you have 2 or more relatives with breast and/or bowel cancer? No   Pertinent mammograms are reviewed under the imaging tab.    History of abnormal Pap smear:   PAP / HPV 8/14/2017   PAP Negative for squamous intraepithelial lesion or malignancy  Electronically signed by Isaura Daly CT ([facility]) on 8/21/2017 at  3:40 PM       Reviewed and updated as needed this visit by clinical staff    Allergies  Meds                Reviewed and updated as needed this visit by Provider                     Physical Exam:  General Appearance:   Vitals:    05/26/22 1125   BP: 110/64   BP Location: Left arm   Patient Position: Sitting   Cuff Size: Adult Regular   Pulse: 58   SpO2: 99%   Weight: 57.9 kg (127 lb 11.2 oz)   Height: 1.644 m (5' 4.72\")     Wt Readings from Last 3 Encounters:   05/26/22 57.9 kg (127 lb 11.2 oz)   11/04/21 56.7 kg (125 lb)   01/03/20 56.9 kg (125 lb 7 oz)     Body mass index is 21.43 kg/m .    Constitutional:  Reveals a pleasant female.  Vitals:  Per nursing notes.  HEENT:No cervical LAD, no thyromegaly,  conjunctiva is pink, no scleral icterus, TMs are visualized and normal bl, oropharynx is clear, no exudates,   Cardiac:  Regular rate and rhythm,no murmurs, rubs, or gallops.  Legs without edema. Palpation of the radial pulse regular.  Lungs: Clear to auscultation bl.  Respiratory effort normal.  Abdomen:positive BS, soft, nontender, nondistended.  No hepato-splenomagaly  Skin:   Without rash, bruise, or palpable lesions.  Rheumatologic: Normal joints and nails of the hands.  Neurologic:  Cranial nerves II-XII intact. " "    Psychiatric: affect appropriate, memory intact.   Breast exam: No axilla lymphadenopathy, breast masses or skin changes appreciated.  GYN exam: Normal external genitalia, cervix is midline, IUD strings visible, Pap smear obtained, no pain or masses on bimanual exam.    Data Review:    Analysis and Summary of Old Records (2): yes      Records Requested (1): no      Other History Summarized (from other people in the room) (2): no    Radiology Tests Summarized (XRAY/CT/MRI/DXA) (1): no    Labs Reviewed (1): yes    Medicine Tests Reviewed (EKG/ECHO/COLONOSCOPY/EGD) (1): no    Independent Review of EKG or X-RAY (2): no        ASSESSMENT/PLAN:   Rangel was seen today for physical.    Diagnoses and all orders for this visit:    Annual physical exam  She will come back for fasting blood work.  Discussed colon cancer screening and she elected colonoscopy.  Pap smear was obtained today.  She has mammogram scheduled for later today.  -     Adult Gastro Ref - Procedure Only; Future  -     Hepatitis C Screen Reflex to HCV RNA Quant and Genotype; Future  -     Pap Screen with HPV - recommended age 30 - 65 years    Skin lesion  She has had a mole on her right thigh since childhood but currently it started to change and has irregular borders and some differences in color.  We will have her see dermatology  -     Adult Dermatology Referral; Future    Cyclothymic disorder  She is followed by psychiatrist and currently mood is well controlled on Lamictal and trazodone.  Psychiatry is due to order cholesterol panel and Lamictal level.  Patient will come back to have it done fasting at a future time  -     Basic metabolic panel  (Ca, Cl, CO2, Creat, Gluc, K, Na, BUN); Future    Vitamin D deficiency  -     Vitamin D Deficiency; Future    Other orders  -     REVIEW OF HEALTH MAINTENANCE PROTOCOL ORDERS    Estimated body mass index is 21.12 kg/m  as calculated from the following:    Height as of 1/3/20: 1.638 m (5' 4.5\").    Weight as " of 11/4/21: 56.7 kg (125 lb).    She reports that she has never smoked. She has never used smokeless tobacco.      Susan Guerra MD  [facility]  Answers for HPI/ROS submitted by the patient on 5/26/2022  If you checked off any problems, how difficult have these problems made it for you to do your work, take care of things at home, or get along with other people?: Not difficult at all  PHQ9 TOTAL SCORE: 3

## 2022-05-26 NOTE — PATIENT INSTRUCTIONS
Come back for fasting lab work (remind them to draw all future labs)    2. Schedule colonoscopy in the summer    3. Pap smear results in 1 week.

## 2022-05-31 LAB
HUMAN PAPILLOMA VIRUS 16 DNA: NEGATIVE
HUMAN PAPILLOMA VIRUS 18 DNA: NEGATIVE
HUMAN PAPILLOMA VIRUS FINAL DIAGNOSIS: NORMAL
HUMAN PAPILLOMA VIRUS OTHER HR: NEGATIVE

## 2022-06-06 LAB
BKR LAB AP GYN ADEQUACY: NORMAL
BKR LAB AP GYN INTERPRETATION: NORMAL
BKR LAB AP HPV REFLEX: NORMAL
BKR LAB AP PREVIOUS ABNORMAL: NORMAL
PATH REPORT.COMMENTS IMP SPEC: NORMAL
PATH REPORT.COMMENTS IMP SPEC: NORMAL
PATH REPORT.RELEVANT HX SPEC: NORMAL

## 2022-06-10 ENCOUNTER — LAB (OUTPATIENT)
Dept: LAB | Facility: CLINIC | Age: 47
End: 2022-06-10
Payer: COMMERCIAL

## 2022-06-10 DIAGNOSIS — Z11.59 NEED FOR HEPATITIS C SCREENING TEST: ICD-10-CM

## 2022-06-10 DIAGNOSIS — F34.0 CYCLOTHYMIC DISORDER: ICD-10-CM

## 2022-06-10 DIAGNOSIS — E55.9 VITAMIN D DEFICIENCY: ICD-10-CM

## 2022-06-10 LAB
ANION GAP SERPL CALCULATED.3IONS-SCNC: 14 MMOL/L (ref 5–18)
BUN SERPL-MCNC: 14 MG/DL (ref 8–22)
CALCIUM SERPL-MCNC: 9.4 MG/DL (ref 8.5–10.5)
CHLORIDE BLD-SCNC: 104 MMOL/L (ref 98–107)
CO2 SERPL-SCNC: 24 MMOL/L (ref 22–31)
CREAT SERPL-MCNC: 0.77 MG/DL (ref 0.6–1.1)
DEPRECATED CALCIDIOL+CALCIFEROL SERPL-MC: 63 UG/L (ref 20–75)
GFR SERPL CREATININE-BSD FRML MDRD: >90 ML/MIN/1.73M2
GLUCOSE BLD-MCNC: 91 MG/DL (ref 70–125)
HCV AB SERPL QL IA: NONREACTIVE
POTASSIUM BLD-SCNC: 3.6 MMOL/L (ref 3.5–5)
SODIUM SERPL-SCNC: 142 MMOL/L (ref 136–145)

## 2022-06-10 PROCEDURE — 82306 VITAMIN D 25 HYDROXY: CPT

## 2022-06-10 PROCEDURE — 80048 BASIC METABOLIC PNL TOTAL CA: CPT

## 2022-06-10 PROCEDURE — 36415 COLL VENOUS BLD VENIPUNCTURE: CPT

## 2022-06-10 PROCEDURE — 86803 HEPATITIS C AB TEST: CPT

## 2022-10-01 ENCOUNTER — HEALTH MAINTENANCE LETTER (OUTPATIENT)
Age: 47
End: 2022-10-01

## 2022-10-26 ENCOUNTER — MYC REFILL (OUTPATIENT)
Dept: INTERNAL MEDICINE | Facility: CLINIC | Age: 47
End: 2022-10-26

## 2022-10-26 DIAGNOSIS — F41.9 ANXIETY: ICD-10-CM

## 2022-10-27 RX ORDER — LORAZEPAM 0.5 MG/1
0.5 TABLET ORAL DAILY
Qty: 20 TABLET | Refills: 0 | Status: SHIPPED | OUTPATIENT
Start: 2022-10-27 | End: 2023-06-21

## 2022-11-03 ENCOUNTER — OFFICE VISIT (OUTPATIENT)
Dept: URGENT CARE | Facility: URGENT CARE | Age: 47
End: 2022-11-03
Payer: COMMERCIAL

## 2022-11-03 VITALS
DIASTOLIC BLOOD PRESSURE: 82 MMHG | TEMPERATURE: 98.4 F | OXYGEN SATURATION: 100 % | SYSTOLIC BLOOD PRESSURE: 128 MMHG | HEART RATE: 54 BPM

## 2022-11-03 DIAGNOSIS — L98.9 SKIN LESION: Primary | ICD-10-CM

## 2022-11-03 PROCEDURE — 99213 OFFICE O/P EST LOW 20 MIN: CPT | Performed by: FAMILY MEDICINE

## 2022-11-03 RX ORDER — SULFAMETHOXAZOLE/TRIMETHOPRIM 800-160 MG
1 TABLET ORAL 2 TIMES DAILY
Qty: 10 TABLET | Refills: 0 | Status: SHIPPED | OUTPATIENT
Start: 2022-11-03 | End: 2022-11-08

## 2022-11-03 NOTE — PROGRESS NOTES
Assessment & Plan     Skin lesion  - sulfamethoxazole-trimethoprim (BACTRIM DS) 800-160 MG tablet  Dispense: 10 tablet; Refill: 0   Low suspicion for infection at this time. She is also without any new restrictions/limitations since this started.   Without active discharge, swelling, or fluctuance. We discussed if symptoms were to develop she can start oral bactrim -- follow-up if symptoms worsen beyond this.     Abdias Meyer MD   [place] [facility]    Waldo Multani is a 47 year old female who presents to clinic today for the following health issues:  Chief Complaint   Patient presents with     Trauma     Started last night. Surgical scar from June has puss and blood coming from it. Patient states that she cleaned at home and placed some bacitracin on it and came in today to rule out infection.      HPI    A few drops of blood mixed with pus was seen in this area of the open wound    Hx of biopsy for suspected melanoma earlier this year the length of this vertical scar was reportedly extended to help with symmetry/closure.     No fevers. No new limitations in function    She is very active and is able to run        Patient Active Problem List    Diagnosis Date Noted     Cyclothymic disorder 07/02/2021     Priority: Medium     Fibroid uterus 06/26/2019     Priority: Medium     Noted on ultrasound IUD placement check 6/25/19: Small subserosal fibroid   posterior uterine  body 2.3 x 1.7 x 1.9 cm.         Encounter for other general counseling or advice on contraception 06/07/2019     Priority: Medium     Encounter for IUD insertion 06/07/2019     Priority: Medium     Mirena IUD inserted 6/7/2019 6/25/19--Placement confirmed via ultrasound         PMDD (premenstrual dysphoric disorder) 08/07/2015     Priority: Medium     Cystic Acne      Priority: Medium     Created by Conversion         Contact Dermatitis      Priority: Medium     Created by Conversion         Insomnia      Priority: Medium     Created  by Conversion           Current Outpatient Medications   Medication     sulfamethoxazole-trimethoprim (BACTRIM DS) 800-160 MG tablet     lamoTRIgine (LAMICTAL) 100 MG tablet     LORazepam (ATIVAN) 0.5 MG tablet     traZODone (DESYREL) 50 MG tablet     traZODone (DESYREL) 50 MG tablet     No current facility-administered medications for this visit.           Objective    /82   Pulse 54   Temp 98.4  F (36.9  C) (Oral)   SpO2 100%   Physical Exam   R leg: intact plantar flexion, no swelling at the site of the open sore  Posterior proximal lower leg: visible vertical incision scar midline to this there is a small area of damaged skin with granulated tissue formed there is no fluctuance. No discharge present.         No results found for any visits on 11/03/22.                  The use of Dragon/[employer] dictation services may have been used to construct the content in this note; any grammatical or spelling errors are non-intentional. Please contact the author of this note directly if you are in need of any clarification.

## 2022-11-03 NOTE — PATIENT INSTRUCTIONS
If you see increasing redness/discomfort or swelling/discharge then start the oral antibiotic as prescribed      If symptoms not improving in the next 5 days call your Doctor's office or go in to be evaluated

## 2022-11-29 ENCOUNTER — MYC MEDICAL ADVICE (OUTPATIENT)
Dept: INTERNAL MEDICINE | Facility: CLINIC | Age: 47
End: 2022-11-29

## 2022-11-29 DIAGNOSIS — F34.0 CYCLOTHYMIC DISORDER: Primary | ICD-10-CM

## 2022-11-30 RX ORDER — LAMOTRIGINE 100 MG/1
100 TABLET ORAL DAILY
Qty: 90 TABLET | Refills: 1 | Status: SHIPPED | OUTPATIENT
Start: 2022-11-30 | End: 2023-06-02

## 2023-06-01 DIAGNOSIS — F34.0 CYCLOTHYMIC DISORDER: ICD-10-CM

## 2023-06-02 RX ORDER — LAMOTRIGINE 100 MG/1
TABLET ORAL
Qty: 90 TABLET | Refills: 1 | Status: SHIPPED | OUTPATIENT
Start: 2023-06-02 | End: 2023-12-04

## 2023-06-02 NOTE — TELEPHONE ENCOUNTER
"Routing refill request to provider for review/approval because:  Patient needs to be seen because it has been more than 1 year since last office visit.    Last Written Prescription Date:  11/30/22  Last Fill Quantity: 90,  # refills: 1   Last office visit provider:  5/26/22     Requested Prescriptions   Pending Prescriptions Disp Refills     lamoTRIgine (LAMICTAL) 100 MG tablet [Pharmacy Med Name: LAMOTRIGINE 100 MG TABLET] 90 tablet 1     Sig: TAKE 1 TABLET BY MOUTH EVERY DAY       Anti-Seizure Meds Protocol  Failed - 6/1/2023  2:50 AM        Failed - Recent (12 mo) or future (30 days) visit within the authorizing provider's specialty     Patient has had an office visit with the authorizing provider or a provider within the authorizing providers department within the previous 12 mos or has a future within next 30 days. See \"Patient Info\" tab in inbasket, or \"Choose Columns\" in Meds & Orders section of the refill encounter.              Failed - Review Authorizing provider's last note.      Refer to last progress notes: confirm request is for original authorizing provider (cannot be through other providers).          Failed - Normal CBC on file in past 26 months     Recent Labs   Lab Test 03/17/21  0739   WBC 5.4   RBC 4.22   HGB 12.9   HCT 39.8                    Failed - Normal ALT or AST on file in past 26 months     Recent Labs   Lab Test 03/17/21  0739   ALT 10     Recent Labs   Lab Test 03/17/21  0739   AST 16             Failed - Normal platelet count on file in past 26 months     Recent Labs   Lab Test 03/17/21  0739                  Passed - Medication is active on med list        Passed - No active pregnancy on record        Passed - No positive pregnancy test in last 12 months             Veronica Gallo, RN 06/01/23 11:09 PM  "

## 2023-06-15 ENCOUNTER — ANCILLARY PROCEDURE (OUTPATIENT)
Dept: MAMMOGRAPHY | Facility: CLINIC | Age: 48
End: 2023-06-15
Attending: INTERNAL MEDICINE
Payer: COMMERCIAL

## 2023-06-15 DIAGNOSIS — Z12.31 VISIT FOR SCREENING MAMMOGRAM: ICD-10-CM

## 2023-06-15 PROCEDURE — 77063 BREAST TOMOSYNTHESIS BI: CPT | Mod: TC | Performed by: RADIOLOGY

## 2023-06-15 PROCEDURE — 77067 SCR MAMMO BI INCL CAD: CPT | Mod: TC | Performed by: RADIOLOGY

## 2023-06-19 ENCOUNTER — TRANSFERRED RECORDS (OUTPATIENT)
Dept: HEALTH INFORMATION MANAGEMENT | Facility: CLINIC | Age: 48
End: 2023-06-19
Payer: COMMERCIAL

## 2023-06-20 ASSESSMENT — ENCOUNTER SYMPTOMS
SHORTNESS OF BREATH: 0
HEADACHES: 0
DYSURIA: 0
COUGH: 0
HEMATURIA: 0
HEARTBURN: 0
EYE PAIN: 0
NERVOUS/ANXIOUS: 0
PALPITATIONS: 0
DIARRHEA: 0
NAUSEA: 0
DIZZINESS: 0
ABDOMINAL PAIN: 0
SORE THROAT: 0
WEAKNESS: 0
JOINT SWELLING: 0
FEVER: 0
PARESTHESIAS: 0
FREQUENCY: 0
ARTHRALGIAS: 0
HEMATOCHEZIA: 0
MYALGIAS: 0
BREAST MASS: 0
CHILLS: 0
CONSTIPATION: 0

## 2023-06-20 ASSESSMENT — PATIENT HEALTH QUESTIONNAIRE - PHQ9
SUM OF ALL RESPONSES TO PHQ QUESTIONS 1-9: 0
SUM OF ALL RESPONSES TO PHQ QUESTIONS 1-9: 0

## 2023-06-21 ENCOUNTER — OFFICE VISIT (OUTPATIENT)
Dept: INTERNAL MEDICINE | Facility: CLINIC | Age: 48
End: 2023-06-21
Payer: COMMERCIAL

## 2023-06-21 VITALS
BODY MASS INDEX: 19.63 KG/M2 | HEART RATE: 54 BPM | DIASTOLIC BLOOD PRESSURE: 70 MMHG | SYSTOLIC BLOOD PRESSURE: 108 MMHG | HEIGHT: 65 IN | RESPIRATION RATE: 18 BRPM | TEMPERATURE: 97.5 F | OXYGEN SATURATION: 100 % | WEIGHT: 117.8 LBS

## 2023-06-21 DIAGNOSIS — F41.9 ANXIETY: ICD-10-CM

## 2023-06-21 DIAGNOSIS — Z13.220 LIPID SCREENING: ICD-10-CM

## 2023-06-21 DIAGNOSIS — E55.9 VITAMIN D DEFICIENCY: ICD-10-CM

## 2023-06-21 DIAGNOSIS — Z00.00 ANNUAL PHYSICAL EXAM: Primary | ICD-10-CM

## 2023-06-21 DIAGNOSIS — R21 RASH: ICD-10-CM

## 2023-06-21 DIAGNOSIS — N89.8 VAGINAL ITCHING: ICD-10-CM

## 2023-06-21 PROCEDURE — 99396 PREV VISIT EST AGE 40-64: CPT | Performed by: INTERNAL MEDICINE

## 2023-06-21 RX ORDER — DOXYCYCLINE HYCLATE 100 MG
100 TABLET ORAL 2 TIMES DAILY
Qty: 20 TABLET | Refills: 0 | Status: SHIPPED | OUTPATIENT
Start: 2023-06-21 | End: 2023-07-01

## 2023-06-21 RX ORDER — HYDROXYZINE HYDROCHLORIDE 25 MG/1
25 TABLET, FILM COATED ORAL 3 TIMES DAILY PRN
Qty: 30 TABLET | Refills: 0 | Status: SHIPPED | OUTPATIENT
Start: 2023-06-21 | End: 2024-06-05

## 2023-06-21 RX ORDER — LORAZEPAM 0.5 MG/1
0.5 TABLET ORAL DAILY
Qty: 20 TABLET | Refills: 0 | Status: SHIPPED | OUTPATIENT
Start: 2023-06-21 | End: 2023-10-31

## 2023-06-21 RX ORDER — FLUCONAZOLE 150 MG/1
150 TABLET ORAL ONCE
Qty: 2 TABLET | Refills: 0 | Status: SHIPPED | OUTPATIENT
Start: 2023-06-21 | End: 2023-06-21

## 2023-06-21 ASSESSMENT — ENCOUNTER SYMPTOMS
EYE PAIN: 0
SORE THROAT: 0
PARESTHESIAS: 0
MYALGIAS: 0
ARTHRALGIAS: 0
DIZZINESS: 0
COUGH: 0
NAUSEA: 0
HEADACHES: 0
CONSTIPATION: 0
FREQUENCY: 0
DIARRHEA: 0
BREAST MASS: 0
NERVOUS/ANXIOUS: 0
CHILLS: 0
HEMATOCHEZIA: 0
PALPITATIONS: 0
WEAKNESS: 0
SHORTNESS OF BREATH: 0
FEVER: 0
HEARTBURN: 0
ABDOMINAL PAIN: 0
HEMATURIA: 0
DYSURIA: 0
JOINT SWELLING: 0

## 2023-06-21 NOTE — PATIENT INSTRUCTIONS
Schedule fasting labs    2. Start doxycycline for possible  Lyme's rash. Continue hydroxyzine.If not better, see dermatology.    3. Vaginal yeast infection: Diflucan

## 2023-06-21 NOTE — PROGRESS NOTES
SUBJECTIVE:   CC: Rangel is an 48 year old who presents for preventive health visit.       6/21/2023     7:47 AM   Additional Questions   Roomed by desiree         6/21/2023     7:47 AM   Patient Reported Additional Medications   Patient reports taking the following new medications none     Rangel is a 48 y.o. female with history of anxiety, depression, cyclothymia, PMS, PCOS.  She is currently here for a physical.    She recently completed her first screening colonoscopy and reports there were no polyps found.    She did develop itchy rash on her left thigh 3 weeks ago.  That was after she did hiking in [place].  She did see a provider who prescribed hydroxyzine and prednisone taper.  It did help with each however rash has not resolved and she still has some new lesions forming.  Looking in the older lesions, they do look like target lesions currently although she is they did not look like that in the beginning.  There is a skin line in between 2 lesions connecting them which is unusual.  She reports that the rash is still spreading although slowly.  No fevers or chills.    She has an IUD and has not had any.'s.    She does take vitamin D supplement.    She exercises and denies any problems with shortness of breath or chest pains.  She runs.    Most recently she also developed vaginal itching and used over-the-counter Monistat last night.    She continues to take Lamictal which controls her depression well.  She takes lorazepam infrequently and needs a refill today.  She takes trazodone only as needed when she cannot sleep.    Healthy Habits:     Getting at least 3 servings of Calcium per day:  Yes    Bi-annual eye exam:  NO    Dental care twice a year:  Yes    Sleep apnea or symptoms of sleep apnea:  None    Diet:  Regular (no restrictions)    Frequency of exercise:  4-5 days/week    Duration of exercise:  30-45 minutes    Taking medications regularly:  Yes    Medication side effects:  None    PHQ-2  Total Score: 0    Additional concerns today:  No      Answers for HPI/ROS submitted by the patient on 2023  PHQ9 TOTAL SCORE: 0        Have you ever done Advance Care Planning? (For example, a Health Directive, POLST, or a discussion with a medical provider or your loved ones about your wishes): No, advance care planning information given to patient to review.  Patient plans to discuss their wishes with loved ones or provider.      Social History     Tobacco Use     Smoking status: Never     Smokeless tobacco: Never   Substance Use Topics     Alcohol use: Yes     Alcohol/week: 3.0 - 4.0 standard drinks of alcohol     Comment: Alcoholic Drinks/day: occasionally           2023    10:21 AM   Alcohol Use   Prescreen: >3 drinks/day or >7 drinks/week? Not Applicable   Reviewed orders with patient.  Reviewed health maintenance and updated orders accordingly -yes      Breast Cancer Screenin/26/2022    11:21 AM 2022     2:13 PM 2023    10:21 AM   Breast CA Risk Assessment (FHS-7)   Do you have a family history of breast, colon, or ovarian cancer? Yes Yes No / Unknown     Pertinent mammograms are reviewed under the imaging tab.    History of abnormal Pap smear: No      Latest Ref Rng & Units 2022    11:55 AM 2017     2:07 PM   PAP / HPV   PAP  Negative for Intraepithelial Lesion or Malignancy (NILM)  Negative for squamous intraepithelial lesion or malignancy  Electronically signed by Isaura Daly CT (ASCP) on 2017 at  3:40 PM      HPV 16 DNA Negative Negative     HPV 18 DNA Negative Negative     Other HR HPV Negative Negative       Reviewed and updated as needed this visit by clinical staff   Tobacco  Allergies  Meds              Reviewed and updated as needed this visit by Provider                     Review of Systems   Constitutional: Negative for chills and fever.   HENT: Negative for congestion, ear pain, hearing loss and sore throat.    Eyes: Negative for pain  "and visual disturbance.   Respiratory: Negative for cough and shortness of breath.    Cardiovascular: Negative for chest pain, palpitations and peripheral edema.   Gastrointestinal: Negative for abdominal pain, constipation, diarrhea, heartburn, hematochezia and nausea.   Breasts:  Negative for tenderness, breast mass and discharge.   Genitourinary: Positive for vaginal discharge. Negative for dysuria, frequency, genital sores, hematuria, pelvic pain, urgency and vaginal bleeding.   Musculoskeletal: Negative for arthralgias, joint swelling and myalgias.   Skin: Positive for rash.   Neurological: Negative for dizziness, weakness, headaches and paresthesias.   Psychiatric/Behavioral: Negative for mood changes. The patient is not nervous/anxious.       OBJECTIVE:   /70 (BP Location: Left arm, Patient Position: Sitting, Cuff Size: Adult Regular)   Pulse 54   Temp 97.5  F (36.4  C) (Oral)   Resp 18   Ht 1.661 m (5' 5.39\")   Wt 53.4 kg (117 lb 12.8 oz)   LMP  (LMP Unknown)   SpO2 100%   BMI 19.37 kg/m    Physical Exam  General: well appearing female, alert and oriented x3  EYES: Eyelids, conjunctiva, and sclera were normal. Pupils were normal.   HEAD, EARS, NOSE, MOUTH, AND THROAT: no cervical LAD, no thyromegaly or nodules appreciated. TMs are visualized and normal, oropharynx is clear.  RESPIRATORY: respirations non labored, CTA bl, no wheezes, rales, no forced expiratory wheezing.  CARDIOVASCULAR: Heart rate and rhythm were normal. No murmurs, rubs,gallops. There was no peripheral edema.   GASTROINTESTINAL: Positive bowel sounds, abdomen is soft, non tender, non distended.     MUSCULOSKELETAL: Muscle mass was normal for age. No joint synovitis or deformity.  LYMPHATIC: There were no enlarged nodes palpable.  SKIN/HAIR/NAILS: Skin color was normal.  3 round lesions on her left thigh that currently appear as a target lesion, smaller satellite red papular lesions is not at target.  NEUROLOGIC: The patient " was alert and oriented.  Speech was normal.  There is no facial asymmetry.   PSYCHIATRIC:  Mood and affect were normal.   Breast exam: Deferred, she recently had mammogram done      ASSESSMENT/PLAN:   Rangel was seen today for recheck medication and physical.    Diagnoses and all orders for this visit:    Annual physical exam  She is up-to-date on colonoscopy and Pap smear, will obtain colonoscopy with [facility] she has done today.  Up-to-date on vaccines.    Vitamin D deficiency  -     Vitamin D Deficiency; Future    Lipid screening  -     Basic metabolic panel  (Ca, Cl, CO2, Creat, Gluc, K, Na, BUN); Future  -     Lipid panel reflex to direct LDL Fasting; Future    Rash  Rash started 3 weeks ago is each she red papular rash, currently after 2 weeks of prednisone and hydroxyzine each is better but rash is still spreading and 3 lesions look like large target lesions with a thin line between 2 connecting them.  Will start on doxycycline.  If rash continues to spread after a week of doxycycline, I would recommend that she see his dermatologist.  She did take a picture of the rash at home.  -     CBC with platelets and differential; Future  -     doxycycline hyclate (VIBRA-TABS) 100 MG tablet; Take 1 tablet (100 mg) by mouth 2 times daily for 10 days  -     hydrOXYzine (ATARAX) 25 MG tablet; Take 1 tablet (25 mg) by mouth 3 times daily as needed for itching    Anxiety,  and depression  Mood is good on Lamictal, she takes lorazepam infrequently  -     LORazepam (ATIVAN) 0.5 MG tablet; Take 1 tablet (0.5 mg) by mouth daily    Vaginal itching  No new sexual partners recently, will prescribe Diflucan to use if symptoms worsen while on antibiotic.  -     fluconazole (DIFLUCAN) 150 MG tablet; Take 1 tablet (150 mg) by mouth once for 1 dose . May repeat dose in 72 hours if still symptomatic.        Patient has been advised of split billing requirements and indicates understanding: Yes        She reports that she has never  smoked. She has never used smokeless tobacco.    Susan Guerra MD  [facility]

## 2023-06-27 ENCOUNTER — MYC MEDICAL ADVICE (OUTPATIENT)
Dept: INTERNAL MEDICINE | Facility: CLINIC | Age: 48
End: 2023-06-27

## 2023-06-27 ENCOUNTER — LAB (OUTPATIENT)
Dept: LAB | Facility: CLINIC | Age: 48
End: 2023-06-27
Payer: COMMERCIAL

## 2023-06-27 DIAGNOSIS — R21 RASH: ICD-10-CM

## 2023-06-27 DIAGNOSIS — E55.9 VITAMIN D DEFICIENCY: ICD-10-CM

## 2023-06-27 DIAGNOSIS — Z13.220 LIPID SCREENING: ICD-10-CM

## 2023-06-27 LAB
ANION GAP SERPL CALCULATED.3IONS-SCNC: 12 MMOL/L (ref 7–15)
BASOPHILS # BLD AUTO: 0 10E3/UL (ref 0–0.2)
BASOPHILS NFR BLD AUTO: 0 %
BUN SERPL-MCNC: 13.4 MG/DL (ref 6–20)
CALCIUM SERPL-MCNC: 9.2 MG/DL (ref 8.6–10)
CHLORIDE SERPL-SCNC: 103 MMOL/L (ref 98–107)
CHOLEST SERPL-MCNC: 245 MG/DL
CREAT SERPL-MCNC: 0.8 MG/DL (ref 0.51–0.95)
DEPRECATED CALCIDIOL+CALCIFEROL SERPL-MC: 53 UG/L (ref 20–75)
DEPRECATED HCO3 PLAS-SCNC: 26 MMOL/L (ref 22–29)
EOSINOPHIL # BLD AUTO: 0.1 10E3/UL (ref 0–0.7)
EOSINOPHIL NFR BLD AUTO: 3 %
ERYTHROCYTE [DISTWIDTH] IN BLOOD BY AUTOMATED COUNT: 12.7 % (ref 10–15)
GFR SERPL CREATININE-BSD FRML MDRD: 90 ML/MIN/1.73M2
GLUCOSE SERPL-MCNC: 84 MG/DL (ref 70–99)
HCT VFR BLD AUTO: 40.8 % (ref 35–47)
HDLC SERPL-MCNC: 73 MG/DL
HGB BLD-MCNC: 13.2 G/DL (ref 11.7–15.7)
IMM GRANULOCYTES # BLD: 0 10E3/UL
IMM GRANULOCYTES NFR BLD: 0 %
LDLC SERPL CALC-MCNC: 158 MG/DL
LYMPHOCYTES # BLD AUTO: 1.7 10E3/UL (ref 0.8–5.3)
LYMPHOCYTES NFR BLD AUTO: 35 %
MCH RBC QN AUTO: 30.6 PG (ref 26.5–33)
MCHC RBC AUTO-ENTMCNC: 32.4 G/DL (ref 31.5–36.5)
MCV RBC AUTO: 94 FL (ref 78–100)
MONOCYTES # BLD AUTO: 0.4 10E3/UL (ref 0–1.3)
MONOCYTES NFR BLD AUTO: 8 %
NEUTROPHILS # BLD AUTO: 2.7 10E3/UL (ref 1.6–8.3)
NEUTROPHILS NFR BLD AUTO: 55 %
NONHDLC SERPL-MCNC: 172 MG/DL
PLATELET # BLD AUTO: 183 10E3/UL (ref 150–450)
POTASSIUM SERPL-SCNC: 4 MMOL/L (ref 3.4–5.3)
RBC # BLD AUTO: 4.32 10E6/UL (ref 3.8–5.2)
SODIUM SERPL-SCNC: 141 MMOL/L (ref 136–145)
TRIGL SERPL-MCNC: 70 MG/DL
WBC # BLD AUTO: 4.9 10E3/UL (ref 4–11)

## 2023-06-27 PROCEDURE — 82306 VITAMIN D 25 HYDROXY: CPT

## 2023-06-27 PROCEDURE — 85025 COMPLETE CBC W/AUTO DIFF WBC: CPT

## 2023-06-27 PROCEDURE — 80061 LIPID PANEL: CPT

## 2023-06-27 PROCEDURE — 36415 COLL VENOUS BLD VENIPUNCTURE: CPT

## 2023-06-27 PROCEDURE — 80048 BASIC METABOLIC PNL TOTAL CA: CPT

## 2023-10-31 ENCOUNTER — MYC REFILL (OUTPATIENT)
Dept: INTERNAL MEDICINE | Facility: CLINIC | Age: 48
End: 2023-10-31
Payer: COMMERCIAL

## 2023-10-31 DIAGNOSIS — F41.9 ANXIETY: ICD-10-CM

## 2023-10-31 RX ORDER — LORAZEPAM 0.5 MG/1
0.5 TABLET ORAL DAILY
Qty: 20 TABLET | Refills: 0 | Status: SHIPPED | OUTPATIENT
Start: 2023-10-31 | End: 2024-01-23

## 2023-12-02 DIAGNOSIS — F34.0 CYCLOTHYMIC DISORDER: ICD-10-CM

## 2023-12-04 RX ORDER — LAMOTRIGINE 100 MG/1
TABLET ORAL
Qty: 90 TABLET | Refills: 3 | Status: SHIPPED | OUTPATIENT
Start: 2023-12-04 | End: 2024-01-16

## 2024-01-14 ENCOUNTER — MYC MEDICAL ADVICE (OUTPATIENT)
Dept: INTERNAL MEDICINE | Facility: CLINIC | Age: 49
End: 2024-01-14
Payer: COMMERCIAL

## 2024-01-14 DIAGNOSIS — F34.0 CYCLOTHYMIC DISORDER: Primary | ICD-10-CM

## 2024-01-16 RX ORDER — LAMOTRIGINE 150 MG/1
150 TABLET ORAL DAILY
Qty: 90 TABLET | Refills: 1 | Status: SHIPPED | OUTPATIENT
Start: 2024-01-16 | End: 2024-06-05

## 2024-03-12 ENCOUNTER — MYC REFILL (OUTPATIENT)
Dept: INTERNAL MEDICINE | Facility: CLINIC | Age: 49
End: 2024-03-12
Payer: COMMERCIAL

## 2024-03-12 DIAGNOSIS — F34.0 CYCLOTHYMIC DISORDER: ICD-10-CM

## 2024-03-13 RX ORDER — LAMOTRIGINE 150 MG/1
150 TABLET ORAL DAILY
Qty: 90 TABLET | Refills: 1 | OUTPATIENT
Start: 2024-03-13

## 2024-03-16 ENCOUNTER — MYC MEDICAL ADVICE (OUTPATIENT)
Dept: INTERNAL MEDICINE | Facility: CLINIC | Age: 49
End: 2024-03-16
Payer: COMMERCIAL

## 2024-03-16 DIAGNOSIS — F34.0 CYCLOTHYMIC DISORDER: Primary | ICD-10-CM

## 2024-03-18 RX ORDER — LAMOTRIGINE 100 MG/1
100 TABLET ORAL DAILY
Qty: 90 TABLET | Refills: 1 | Status: SHIPPED | OUTPATIENT
Start: 2024-03-18 | End: 2024-06-05

## 2024-05-31 SDOH — HEALTH STABILITY: PHYSICAL HEALTH: ON AVERAGE, HOW MANY DAYS PER WEEK DO YOU ENGAGE IN MODERATE TO STRENUOUS EXERCISE (LIKE A BRISK WALK)?: 4 DAYS

## 2024-05-31 SDOH — HEALTH STABILITY: PHYSICAL HEALTH: ON AVERAGE, HOW MANY MINUTES DO YOU ENGAGE IN EXERCISE AT THIS LEVEL?: 40 MIN

## 2024-05-31 ASSESSMENT — SOCIAL DETERMINANTS OF HEALTH (SDOH): HOW OFTEN DO YOU GET TOGETHER WITH FRIENDS OR RELATIVES?: MORE THAN THREE TIMES A WEEK

## 2024-06-05 ENCOUNTER — OFFICE VISIT (OUTPATIENT)
Dept: INTERNAL MEDICINE | Facility: CLINIC | Age: 49
End: 2024-06-05
Payer: COMMERCIAL

## 2024-06-05 VITALS
TEMPERATURE: 97 F | HEIGHT: 65 IN | SYSTOLIC BLOOD PRESSURE: 96 MMHG | BODY MASS INDEX: 21.04 KG/M2 | OXYGEN SATURATION: 99 % | RESPIRATION RATE: 13 BRPM | DIASTOLIC BLOOD PRESSURE: 59 MMHG | HEART RATE: 50 BPM | WEIGHT: 126.3 LBS

## 2024-06-05 DIAGNOSIS — Z12.31 ENCOUNTER FOR SCREENING MAMMOGRAM FOR BREAST CANCER: ICD-10-CM

## 2024-06-05 DIAGNOSIS — F41.9 ANXIETY: ICD-10-CM

## 2024-06-05 DIAGNOSIS — E55.9 VITAMIN D DEFICIENCY: ICD-10-CM

## 2024-06-05 DIAGNOSIS — C43.71: ICD-10-CM

## 2024-06-05 DIAGNOSIS — Z00.00 ANNUAL PHYSICAL EXAM: Primary | ICD-10-CM

## 2024-06-05 DIAGNOSIS — E78.5 HYPERLIPIDEMIA LDL GOAL <100: ICD-10-CM

## 2024-06-05 DIAGNOSIS — F31.81 MILD HYPOMANIC BIPOLAR II DISORDER (H): ICD-10-CM

## 2024-06-05 LAB
BASOPHILS # BLD AUTO: 0 10E3/UL (ref 0–0.2)
BASOPHILS NFR BLD AUTO: 0 %
EOSINOPHIL # BLD AUTO: 0.1 10E3/UL (ref 0–0.7)
EOSINOPHIL NFR BLD AUTO: 2 %
ERYTHROCYTE [DISTWIDTH] IN BLOOD BY AUTOMATED COUNT: 12.9 % (ref 10–15)
HCT VFR BLD AUTO: 40.4 % (ref 35–47)
HGB BLD-MCNC: 13.1 G/DL (ref 11.7–15.7)
IMM GRANULOCYTES # BLD: 0 10E3/UL
IMM GRANULOCYTES NFR BLD: 0 %
LYMPHOCYTES # BLD AUTO: 1.2 10E3/UL (ref 0.8–5.3)
LYMPHOCYTES NFR BLD AUTO: 30 %
MCH RBC QN AUTO: 30.4 PG (ref 26.5–33)
MCHC RBC AUTO-ENTMCNC: 32.4 G/DL (ref 31.5–36.5)
MCV RBC AUTO: 94 FL (ref 78–100)
MONOCYTES # BLD AUTO: 0.3 10E3/UL (ref 0–1.3)
MONOCYTES NFR BLD AUTO: 8 %
NEUTROPHILS # BLD AUTO: 2.4 10E3/UL (ref 1.6–8.3)
NEUTROPHILS NFR BLD AUTO: 59 %
PLATELET # BLD AUTO: 216 10E3/UL (ref 150–450)
RBC # BLD AUTO: 4.31 10E6/UL (ref 3.8–5.2)
WBC # BLD AUTO: 4 10E3/UL (ref 4–11)

## 2024-06-05 PROCEDURE — 36415 COLL VENOUS BLD VENIPUNCTURE: CPT | Performed by: INTERNAL MEDICINE

## 2024-06-05 PROCEDURE — 80053 COMPREHEN METABOLIC PANEL: CPT | Performed by: INTERNAL MEDICINE

## 2024-06-05 PROCEDURE — 80061 LIPID PANEL: CPT | Performed by: INTERNAL MEDICINE

## 2024-06-05 PROCEDURE — 99396 PREV VISIT EST AGE 40-64: CPT | Performed by: INTERNAL MEDICINE

## 2024-06-05 PROCEDURE — 85025 COMPLETE CBC W/AUTO DIFF WBC: CPT | Performed by: INTERNAL MEDICINE

## 2024-06-05 PROCEDURE — 82306 VITAMIN D 25 HYDROXY: CPT | Performed by: INTERNAL MEDICINE

## 2024-06-05 PROCEDURE — 84443 ASSAY THYROID STIM HORMONE: CPT | Performed by: INTERNAL MEDICINE

## 2024-06-05 RX ORDER — LORAZEPAM 0.5 MG/1
0.5 TABLET ORAL DAILY
Qty: 20 TABLET | Refills: 0 | Status: SHIPPED | OUTPATIENT
Start: 2024-06-05 | End: 2024-08-09

## 2024-06-05 RX ORDER — LAMOTRIGINE 100 MG/1
100 TABLET ORAL DAILY
Qty: 90 TABLET | Refills: 3 | Status: SHIPPED | OUTPATIENT
Start: 2024-06-05

## 2024-06-05 ASSESSMENT — PATIENT HEALTH QUESTIONNAIRE - PHQ9
SUM OF ALL RESPONSES TO PHQ QUESTIONS 1-9: 1
SUM OF ALL RESPONSES TO PHQ QUESTIONS 1-9: 1
10. IF YOU CHECKED OFF ANY PROBLEMS, HOW DIFFICULT HAVE THESE PROBLEMS MADE IT FOR YOU TO DO YOUR WORK, TAKE CARE OF THINGS AT HOME, OR GET ALONG WITH OTHER PEOPLE: NOT DIFFICULT AT ALL

## 2024-06-05 ASSESSMENT — PAIN SCALES - GENERAL: PAINLEVEL: NO PAIN (0)

## 2024-06-05 NOTE — PROGRESS NOTES
Preventive Care Visit  [facility]  Susan Guerra MD, Internal Medicine  Jun 5, 2024      Assessment & Plan     Annual physical exam  Will do fasting blood work today, she is up-to-date on Pap smear and colonoscopy, she will do mammogram later this year.  - MA Screen Bilateral w/[facility]win; Future    Hyperlipidemia LDL goal <100  LDL was 156 last year, she attributes it to being on steroid due to her rash, she does have family history of hyperlipidemia in her mom.  She rarely eats red meat  - Lipid panel reflex to direct LDL Fasting; Future  - Comprehensive metabolic panel; Future  - TSH with free T4 reflex; Future    Anxiety  She takes lorazepam just a couple of times a month.  - LORazepam (ATIVAN) 0.5 MG tablet; Take 1 tablet (0.5 mg) by mouth daily  - TSH with free T4 reflex; Future    Melanoma of right posterior calf (H)  Diagnosed 2 years ago, she sees dermatologist every 6 months  - Comprehensive metabolic panel; Future  - CBC with platelets and differential; Future    Mild hypomanic bipolar II disorder (H)  Doing very well on Lamictal, of note increasing her dose to 150 mg was not helpful and she had more side effects.  - lamoTRIgine (LAMICTAL) 100 MG tablet; Take 1 tablet (100 mg) by mouth daily  - CBC with platelets and differential; Future    Vitamin D deficiency  - Vitamin D Deficiency; Future      Counseling  Appropriate preventive services were discussed with this patient, including applicable screening as appropriate for fall prevention, nutrition, physical activity, Tobacco-use cessation, weight loss and cognition.  Checklist reviewing preventive services available has been given to the patient.  Reviewed patient's diet, addressing concerns and/or questions.   She is at risk for psychosocial distress and has been provided with information to reduce risk.       Waldo Multani is a 49 year old, presenting for the following:  Physical (Medication review and refills)        6/5/2024      7:17 AM   Additional Questions   Roomed by Bessie        Health Care Directive  Patient does not have a Health Care Directive or Living Will: Discussed advance care planning with patient; information given to patient to review.    PAUL Multani is a 49 y.o. female with history of anxiety, depression, cyclothymia, PMS, PCOS, history of Lyme's disease.  She is currently here for a physical.    No concerns or complaints today.  She continues to run 4 miles 5 days a week, she feels great except for occasional achiness.  She is trying to stretch more.    Her daughter graduated from high school and will be attending [employer].    Mood has been good on Lamictal, of note when we tried increasing the dose from 100-1 50 she could not tolerate it well.  Occasionally for insomnia she takes trazodone and when she feels super hyper she takes lorazepam which is not very often.    She continues to see dermatologist every 6 months due to history of 2 years ago.    Rangel has IUD and has not had any.'s, denies hot flashes.        5/31/2024   General Health   How would you rate your overall physical health? Good   Feel stress (tense, anxious, or unable to sleep) Only a little   (!) STRESS CONCERN      5/31/2024   Nutrition   Three or more servings of calcium each day? Yes   Diet: Regular (no restrictions)   How many servings of fruit and vegetables per day? 4 or more   How many sweetened beverages each day? 0-1         5/31/2024   Exercise   Days per week of moderate/strenous exercise 4 days   Average minutes spent exercising at this level 40 min         5/31/2024   Social Factors   Frequency of gathering with friends or relatives More than three times a week   Worry food won't last until get money to buy more No   Food not last or not have enough money for food? No   Do you have housing?  Yes   Are you worried about losing your housing? No   Lack of transportation? No   Unable to get utilities (heat,electricity)? No         5/31/2024    Dental   Dentist two times every year? Yes         5/31/2024   TB Screening   Were you born outside of the [place]? No       Today's PHQ-9 Score:       6/5/2024     7:11 AM   PHQ-9 SCORE   PHQ-9 Total Score MyChart 1 (Minimal depression)   PHQ-9 Total Score 1         5/31/2024   Substance Use   Alcohol more than 3/day or more than 7/wk No   Do you use any other substances recreationally? No     Social History     Tobacco Use    Smoking status: Never    Smokeless tobacco: Never   Vaping Use    Vaping status: Never Used   Substance Use Topics    Alcohol use: Yes     Alcohol/week: 3.0 - 4.0 standard drinks of alcohol     Comment: Alcoholic Drinks/day: occasionally    Drug use: No             5/31/2024   Breast Cancer Screening   Family history of breast, colon, or ovarian cancer? Yes         5/31/2024   LAST FHS-7 RESULTS   1st degree relative breast or ovarian cancer No   Any relative bilateral breast cancer Yes   Any male have breast cancer No   Any ONE woman have BOTH breast AND ovarian cancer No   Any woman with breast cancer before 50yrs No   2 or more relatives with breast AND/OR ovarian cancer No   2 or more relatives with breast AND/OR bowel cancer No           5/31/2024   STI Screening   New sexual partner(s) since last STI/HIV test? No     History of abnormal Pap smear:         Latest Ref Rng & Units 5/26/2022    11:55 AM 8/14/2017     2:07 PM   PAP / HPV   PAP  Negative for Intraepithelial Lesion or Malignancy (NILM)  Negative for squamous intraepithelial lesion or malignancy  Electronically signed by Isaura Daly CT (ASCP) on 8/21/2017 at  3:40 PM      HPV 16 DNA Negative Negative     HPV 18 DNA Negative Negative     Other HR HPV Negative Negative       ASCVD Risk   The 10-year ASCVD risk score (Sushant BOND, et al., 2019) is: 0.6%    Values used to calculate the score:      Age: 49 years      Sex: Female      Is Non- : No      Diabetic: No      Tobacco smoker: No       "Systolic Blood Pressure: 96 mmHg      Is BP treated: No      HDL Cholesterol: 73 mg/dL      Total Cholesterol: 245 mg/dL        5/31/2024   Contraception/Family Planning   Questions about contraception or family planning No        Reviewed and updated as needed this visit by Provider                    Review of systems: As above, bowels have been slightly sluggish, she is up-to-date on colonoscopy, no chest pains palpitations or passing out.     Objective    Exam  BP 96/59 (BP Location: Left arm, Patient Position: Sitting, Cuff Size: Adult Regular)   Pulse 50   Temp 97  F (36.1  C) (Tympanic)   Resp 13   Ht 1.661 m (5' 5.39\")   Wt 57.3 kg (126 lb 4.8 oz)   LMP  (LMP Unknown)   SpO2 99%   BMI 20.76 kg/m     Estimated body mass index is 20.76 kg/m  as calculated from the following:    Height as of this encounter: 1.661 m (5' 5.39\").    Weight as of this encounter: 57.3 kg (126 lb 4.8 oz).    Physical Exam  General: well appearing female, alert and oriented x3  EYES: Eyelids, conjunctiva, and sclera were normal. Pupils were normal.   HEAD, EARS, NOSE, MOUTH, AND THROAT: no cervical LAD, no thyromegaly or nodules appreciated. TMs are visualized and normal, oropharynx is clear.  RESPIRATORY: respirations non labored, CTA bl, no wheezes, rales, no forced expiratory wheezing.  CARDIOVASCULAR: Heart rate and rhythm were normal. No murmurs, rubs,gallops. There was no peripheral edema.   GASTROINTESTINAL: Positive bowel sounds, abdomen is soft, non tender, non distended.     MUSCULOSKELETAL: Muscle mass was normal for age. No joint synovitis or deformity.  LYMPHATIC: There were no enlarged nodes palpable.  SKIN/HAIR/NAILS: Skin color was normal.  No rashes.  NEUROLOGIC: The patient was alert and oriented.  Speech was normal.  There is no facial asymmetry.   PSYCHIATRIC:  Mood and affect were normal.  No flight of ideas or pressured speech, affect is normal  Breast exam: No axilla lymphadenopathy, breast masses or " skin changes appreciated.        Signed Electronically by: Susan Guerra MD    Answers submitted by the patient for this visit:  Patient Health Questionnaire (Submitted on 6/5/2024)  If you checked off any problems, how difficult have these problems made it for you to do your work, take care of things at home, or get along with other people?: Not difficult at all  PHQ9 TOTAL SCORE: 1

## 2024-06-06 LAB
ALBUMIN SERPL BCG-MCNC: 4.6 G/DL (ref 3.5–5.2)
ALP SERPL-CCNC: 66 U/L (ref 40–150)
ALT SERPL W P-5'-P-CCNC: 16 U/L (ref 0–50)
ANION GAP SERPL CALCULATED.3IONS-SCNC: 10 MMOL/L (ref 7–15)
AST SERPL W P-5'-P-CCNC: 23 U/L (ref 0–45)
BILIRUB SERPL-MCNC: 0.5 MG/DL
BUN SERPL-MCNC: 17.3 MG/DL (ref 6–20)
CALCIUM SERPL-MCNC: 9 MG/DL (ref 8.6–10)
CHLORIDE SERPL-SCNC: 104 MMOL/L (ref 98–107)
CHOLEST SERPL-MCNC: 242 MG/DL
CREAT SERPL-MCNC: 0.73 MG/DL (ref 0.51–0.95)
DEPRECATED HCO3 PLAS-SCNC: 26 MMOL/L (ref 22–29)
EGFRCR SERPLBLD CKD-EPI 2021: >90 ML/MIN/1.73M2
FASTING STATUS PATIENT QL REPORTED: ABNORMAL
FASTING STATUS PATIENT QL REPORTED: NORMAL
GLUCOSE SERPL-MCNC: 88 MG/DL (ref 70–99)
HDLC SERPL-MCNC: 79 MG/DL
LDLC SERPL CALC-MCNC: 148 MG/DL
NONHDLC SERPL-MCNC: 163 MG/DL
POTASSIUM SERPL-SCNC: 3.8 MMOL/L (ref 3.4–5.3)
PROT SERPL-MCNC: 6.9 G/DL (ref 6.4–8.3)
SODIUM SERPL-SCNC: 140 MMOL/L (ref 135–145)
TRIGL SERPL-MCNC: 76 MG/DL
TSH SERPL DL<=0.005 MIU/L-ACNC: 1.08 UIU/ML (ref 0.3–4.2)
VIT D+METAB SERPL-MCNC: 44 NG/ML (ref 20–50)

## 2024-07-15 ENCOUNTER — PRE VISIT (OUTPATIENT)
Dept: PSYCHIATRY | Facility: CLINIC | Age: 49
End: 2024-07-15
Payer: COMMERCIAL

## 2024-07-15 NOTE — TELEPHONE ENCOUNTER
PSYCHIATRY CLINIC PHONE INTAKE     SERVICES REQUESTED / INTERESTED IN          Other:  [facility]    Presenting Problem and Brief History                              What would you like to be seen for? (brief description):  Pt was diagnosed with cyclothymia. She was diagnosed 7 years ago. Pt has concerns about menopause and it affecting her mood. Currently taking 100mg of lamotrigine, 50mg of trazadone as needed, and Ativan 0.5mg as needed. No concerns about medications. Lately she's experiencing hypomania but the trazadone is still working.     Have you received a mental health diagnosis? Yes   Which one (s): Cyclothymia  Is there any history of developmental delay?  No   Are you currently seeing a mental health provider?  No            Who / month last seen:  Has previously done therapy, within the last two years. Also worked with a psychiatrist with [facility].   Do you have mental health records elsewhere?  Yes  Will you sign a release so we can obtain them?  Yes    Have you ever been hospitalized for psychiatric reasons?  No  Describe:  NA    Do you have current thoughts of self-harm?  No    Do you currently have thoughts of harming others?  No    Do you have any safety concerns?  No    If yes to these, offer to reach out to a  for follow up.      Substance Use History     Do you have any history of alcohol  or other substance use?  No  Describe:  NA  Have you ever received treatment for this?  No    Describe:  NA     Social History     Who is the patient's a guardian?  No    Name / number: NA  Have you had an ACT team in last 12 months?  No  Describe: NA   OK to leave a detailed voicemail?  Yes    Would you be interested in learning more about research opportunities for which you or your child may qualify? We can connect you with a team member for more information.   unknown   If yes, send an Roost message to Sia Aquino    Medical/ Surgical History                                   Patient Active Problem  List   Diagnosis    Contact Dermatitis    Insomnia    Cystic Acne    PMDD (premenstrual dysphoric disorder)    Encounter for other general counseling or advice on contraception    Encounter for IUD insertion    Fibroid uterus    Cyclothymic disorder    Melanoma of right posterior calf (H)          Medications             Have you taken >3 psychiatric medications in your past?  No  Do you currently take 5 or more medications, including prescriptions, supplements, and other over the counter products?  Vitamin D    If YES to at least one of these questions:   As part of your evaluation in our clinic, we have specially trained pharmacists as part of your care team. Your provider would like for you to meet with one of our pharmacists to review your current and past medications, ensure your med list is up to date, and queue up any questions or concerns you have about medications. They will review all of your medications, not just for mental health, to help ensure you know what you re taking and that everything is working together.     Please schedule patient in [facility] (Lavinia Granado or Sveta Persaud) for 60m MTM in any green space as virtual (video), telephone, or in person (designated in person days per Epic templates).  -Appt notes can say  Psych eval on xx/xx   -Route telephone encounter to the pharmacist who will be seeing the patient.  If patient has questions about insurance coverage or billing, please still schedule the visit and refer them to call the [facility] coordinators at [phone].    Current Outpatient Medications   Medication Sig Dispense Refill    lamoTRIgine (LAMICTAL) 100 MG tablet Take 1 tablet (100 mg) by mouth daily 90 tablet 3    LORazepam (ATIVAN) 0.5 MG tablet Take 1 tablet (0.5 mg) by mouth daily 20 tablet 0    traZODone (DESYREL) 50 MG tablet [TRAZODONE (DESYREL) 50 MG TABLET] 1-2 tbs at bedtime as needed 180 tablet 3         DISPOSITION      7/15/24 Intake complete. Scheduled MTM  on 7/23/24 at 2:30pm with Lavinia Granado. Scheduled [facility] Med Eval on 8/23/24 at 8:00am with .     Erica Melo, [facility]

## 2024-08-09 ENCOUNTER — MYC REFILL (OUTPATIENT)
Dept: INTERNAL MEDICINE | Facility: CLINIC | Age: 49
End: 2024-08-09
Payer: COMMERCIAL

## 2024-08-09 DIAGNOSIS — F41.9 ANXIETY: ICD-10-CM

## 2024-08-09 RX ORDER — LORAZEPAM 0.5 MG/1
0.5 TABLET ORAL DAILY
Qty: 20 TABLET | Refills: 0 | Status: SHIPPED | OUTPATIENT
Start: 2024-08-09 | End: 2024-09-19

## 2024-09-02 ENCOUNTER — MYC MEDICAL ADVICE (OUTPATIENT)
Dept: INTERNAL MEDICINE | Facility: CLINIC | Age: 49
End: 2024-09-02
Payer: COMMERCIAL

## 2024-09-02 DIAGNOSIS — F31.81 MILD HYPOMANIC BIPOLAR II DISORDER (H): Primary | ICD-10-CM

## 2024-09-04 RX ORDER — LAMOTRIGINE 150 MG/1
150 TABLET ORAL DAILY
Qty: 90 TABLET | Refills: 2 | Status: SHIPPED | OUTPATIENT
Start: 2024-09-04

## 2024-09-04 NOTE — TELEPHONE ENCOUNTER
See Gatheredtable message, routing to PCP for review. Current pharmacy is [id number] in [facility]. Also see PCP's progress notes from 6/5/24 visit.    Kelsey Delgado RN  [facility]

## 2024-09-07 ENCOUNTER — HOSPITAL ENCOUNTER (EMERGENCY)
Facility: CLINIC | Age: 49
Discharge: HOME | End: 2024-09-07
Attending: EMERGENCY MEDICINE | Admitting: EMERGENCY MEDICINE
Payer: COMMERCIAL

## 2024-09-07 VITALS
DIASTOLIC BLOOD PRESSURE: 80 MMHG | OXYGEN SATURATION: 98 % | TEMPERATURE: 98.1 F | WEIGHT: 123 LBS | SYSTOLIC BLOOD PRESSURE: 126 MMHG | BODY MASS INDEX: 20.49 KG/M2 | HEART RATE: 55 BPM | HEIGHT: 65 IN | RESPIRATION RATE: 16 BRPM

## 2024-09-07 DIAGNOSIS — F32.A DEPRESSION, UNSPECIFIED DEPRESSION TYPE: ICD-10-CM

## 2024-09-07 PROBLEM — F41.1 GENERALIZED ANXIETY DISORDER: Status: ACTIVE | Noted: 2024-09-07

## 2024-09-07 PROBLEM — F33.9 RECURRENT MAJOR DEPRESSIVE DISORDER (H): Status: ACTIVE | Noted: 2024-09-07

## 2024-09-07 PROCEDURE — 99284 EMERGENCY DEPT VISIT MOD MDM: CPT | Performed by: EMERGENCY MEDICINE

## 2024-09-07 PROCEDURE — 99283 EMERGENCY DEPT VISIT LOW MDM: CPT | Performed by: EMERGENCY MEDICINE

## 2024-09-07 RX ORDER — ESCITALOPRAM OXALATE 10 MG/1
10 TABLET ORAL DAILY
Qty: 30 TABLET | Refills: 0 | Status: SHIPPED | OUTPATIENT
Start: 2024-09-07

## 2024-09-07 RX ORDER — LORAZEPAM 0.5 MG/1
0.5 TABLET ORAL EVERY 4 HOURS PRN
Status: DISCONTINUED | OUTPATIENT
Start: 2024-09-07 | End: 2024-09-07 | Stop reason: HOSPADM

## 2024-09-07 ASSESSMENT — ACTIVITIES OF DAILY LIVING (ADL)
ADLS_ACUITY_SCORE: 35
ADLS_ACUITY_SCORE: 33
ADLS_ACUITY_SCORE: 33
ADLS_ACUITY_SCORE: 35
ADLS_ACUITY_SCORE: 35

## 2024-09-07 ASSESSMENT — COLUMBIA-SUICIDE SEVERITY RATING SCALE - C-SSRS
6. HAVE YOU EVER DONE ANYTHING, STARTED TO DO ANYTHING, OR PREPARED TO DO ANYTHING TO END YOUR LIFE?: NO
1. IN THE PAST MONTH, HAVE YOU WISHED YOU WERE DEAD OR WISHED YOU COULD GO TO SLEEP AND NOT WAKE UP?: NO
2. HAVE YOU ACTUALLY HAD ANY THOUGHTS OF KILLING YOURSELF IN THE PAST MONTH?: NO

## 2024-09-07 NOTE — ED TRIAGE NOTES
Patient crying during triage, patient reports she can;t even function normally and take care of her kids d/t her extreme crying episode.      Triage Assessment (Adult)       Row Name 09/07/24 0382          Triage Assessment    Airway WDL WDL        Respiratory WDL    Respiratory WDL WDL        Skin Circulation/Temperature WDL    Skin Circulation/Temperature WDL WDL        Cardiac WDL    Cardiac WDL WDL        Peripheral/Neurovascular WDL    Peripheral Neurovascular WDL WDL        Cognitive/Neuro/Behavioral WDL    Cognitive/Neuro/Behavioral WDL WDL

## 2024-09-07 NOTE — ED PROVIDER NOTES
"ED Provider Note  [facility]      History     Chief Complaint   Patient presents with    Mental Health Problem     Patient diagnosed with Cyclothymic disorder, in the past month she has had episodes of extreme crying, self harming ( hitting head) , running around the back yard naked.      HPI  Rangel Salamanca is a 49 year old female who presents with 3 months of depressed mood, uncontrollable crying spells, irritability/lability, and feeling like she can't function in day-to-day. Patient accompanied by partner Eric. Symptoms started in June when patient had a \"meltdown\" over something small, \"like an avalanche\", and since then, has been unable to \"pull myself out of it.\" Has been feeling depressed, guilty, unhappy, unable to enjoy time with her children (15 and college-age), and has been accusing her partner Eric of being mean or not understanding her, feels like she has \"episodes\" where she gets angry and irrational, sends numerous texts (eg 80 in 25 minutes) or leaves many voicemails. After these episodes, she is sometimes forgetful. They occur most often in the evening. Says she feels \"terrible all the time because I keep fucking up.\" Denies suicidal ideation or plan but endorses feeling of wanting go disappear so she doesn't hurt people with her words. Per partner Eric, had one episode of hitting her own head over past few months, which people in her family have also done. Had a similar episode 12 years ago where she spent time with a male friend and they cuddled and she felt guilty as she was , lasted several months. Eventually improved, unclear whether medications helped. Has tried several antidepressants but has had to switch because of side effects, ineffectiveness. Has been stable on current regimen until June. Denies hot flashes, night sweats, has irregular/absent period due to IUD and noticed no changes, recently saw ob-gyn for assessment of menopause and " "unsure whether this is happening to her.    Saw a therapist on Thursday and was feeling hopeful initially, but then \"crashed\" Thursday evening/Friday morning. Comes in today because she thought if she told Eric that she never wanted to see him again she'd feel better as she would stop hurting him if she wasn't in his life, but she did not feel better, and they decided to go to the hospital.    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 Feels overwhelmed at times.    Partner Eric knapp, works for ACT team in [facility], says he is very concerned about patient.    Past Medical History  History reviewed. No pertinent past medical history.  Past Surgical History:   Procedure Laterality Date    APPENDECTOMY      [facility] APPENDECTOMY      Description: Appendectomy;  Recorded: 06/19/2008;     lamoTRIgine (LAMICTAL) 100 MG tablet  lamoTRIgine (LAMICTAL) 150 MG tablet  LORazepam (ATIVAN) 0.5 MG tablet  traZODone (DESYREL) 50 MG tablet  escitalopram (LEXAPRO) 10 MG tablet      No Known Allergies  Family History  Family History   Problem Relation Age of Onset    Breast Cancer Maternal Aunt     No Known Problems Mother     No Known Problems Father     No Known Problems Brother      Social History   Social History     Tobacco Use    Smoking status: Never    Smokeless tobacco: Never   Vaping Use    Vaping status: Never Used   Substance Use Topics    Alcohol use: Yes     Alcohol/week: 3.0 - 4.0 standard drinks of alcohol     Comment: Alcoholic Drinks/day: occasionally    Drug use: No      A medically appropriate review of systems was performed with pertinent positives and negatives noted in the HPI, and all other systems negative.    Physical Exam   BP: 126/80  Pulse: 55  Temp: 98.1  F (36.7  C)  Resp: 16  Height: 165.1 cm (5' 5\")  Weight: 55.8 kg (123 lb)  SpO2: 98 %  Physical Exam  Constitutional:       Appearance: Normal appearance.   HENT:      Head: Normocephalic and atraumatic.   Pulmonary: " "     Effort: Pulmonary effort is normal.   Neurological:      Mental Status: She is alert and oriented to person, place, and time.   Psychiatric:         Attention and Perception: Attention normal. She does not perceive auditory or visual hallucinations.         Mood and Affect: Mood is depressed. Affect is tearful.         Behavior: Behavior is cooperative.      Comments: Speech circumstantial at times, ruminative. Frequent references to guilt, fear of abandonment, \"fucking up\"         ED Course, Procedures, & Data      Procedures            No results found for any visits on 09/07/24.  Medications   LORazepam (ATIVAN) tablet 0.5 mg (has no administration in time range)     Labs Ordered and Resulted from Time of ED Arrival to Time of ED Departure - No data to display  No orders to display            Assessment & Plan    Rangel Salamanca is a 49 year old female with history of mild hypomanic bipolar II vs cyclothymia (on lamotrigine), anxiety (on lorzepam 0.5 mg PRN), who presents with 3 months of depressed mood, uncontrollable crying spells, irritability/lability, and feeling like she can't function in day-to-day. Presentation concerning for mood disorder, though reassuringly, patient has no active suicidal ideation, plan, or intent. DEC assessment completed along with safety plan. Patient is safe to discharge home with close follow-up with primary care, outpatient psychiatrist, and therapist.    I have reviewed the nursing notes. I have reviewed the findings, diagnosis, plan and need for follow up with the patient.    New Prescriptions    ESCITALOPRAM (LEXAPRO) 10 MG TABLET    Take 1 tablet (10 mg) by mouth daily.       Final diagnoses:   Depression, unspecified depression type       Russ Vega  [facility] EMERGENCY DEPARTMENT  9/7/2024     ED Attending Physician Attestation    I Russ Vega DO, cared for this patient with the medical student. I have performed a history and " physical examination of the patient and discussed management with the medical student. I reviewed the medical student's documentation above and agree with the documented findings and plan of care.    Summary of HPI, PE, ED Course   Patient evaluated in the emergency department for depression.  She is not actively suicidal or homicidal.  She was evaluated by myself as well as the mental health .  She can contract for safety and does not want to be admitted today.  She will follow-up with primary care as well as her therapist.  We will start her on Lexapro in hopes that this helps with her symptoms.  She is in agreement with the plan and will return as needed.  Critical Care & Procedures  Not applicable.        Medical Decision Making  The patient's presentation is strongly suggestive of high complexity (a chronic illness severe exacerbation, progression, or side effect of treatment).    The patient's evaluation involved:  review of external note(s) from 3+ sources (see separate area of note for details)  ordering and/or review of 3+ test(s) in this encounter (see separate area of note for details)  review of 3+ test result(s) ordered prior to this encounter (see separate area of note for details)    The patient's management involved moderate risk (prescription drug management including medications given in the ED).      Russ Vega,   Emergency Medicine           Russ Vega,   09/07/24 5411

## 2024-09-08 ENCOUNTER — TELEPHONE (OUTPATIENT)
Dept: BEHAVIORAL HEALTH | Facility: CLINIC | Age: 49
End: 2024-09-08
Payer: COMMERCIAL

## 2024-09-08 NOTE — CONSULTS
Diagnostic Evaluation Consultation  Crisis Assessment    Patient Name: Rangel Salamanca  Age:  49 year old  Legal Sex: female  Gender Identity: female  Pronouns:   Race: White  Ethnicity: Not  or   Language: English      Patient was assessed: In person   Crisis Assessment Start Date: 09/07/24  Crisis Assessment Start Time: 0715  Crisis Assessment Stop Time: 0810  Patient location: [facility] EMERGENCY DEPARTMENT                             ED10    Referral Data and Chief Complaint  Rangel Salamanca presents to the ED with family/friends. Patient is presenting to the ED for the following concerns: Significant behavioral change, Anxiety, Depression.   Factors that make the mental health crisis life threatening or complex are:  Pt has been crying uncontrollably for several weeks.  She states that no matter what she tries, she just keeps crying.  She admits that she has been argumentative, and personalizes everything between her and her partner.  She worries about everything..      Informed Consent and Assessment Methods  Explained the crisis assessment process, including applicable information disclosures and limits to confidentiality, assessed understanding of the process, and obtained consent to proceed with the assessment.  Assessment methods included conducting a formal interview with patient, review of medical records, collaboration with medical staff, and obtaining relevant collateral information from family and community providers when available.  : done     Patient response to interventions: acceptance expressed  Coping skills were attempted to reduce the crisis:  Pt has tried reading, watching TV, talking to friends.  Pt's partner brought her to the ED     History of the Crisis   Pt presents to the ED today with previous diagnosis of mild hypo-manic bipolar II, and ANASTASIIA.  She has been prescribed Lamotrigne, trazodone and Ativan PRN.  She states that she is med compliant, and  "recently increased her dose of Lamotrigne from 100mg to 150.  She was also recently placed on estrogen for possible lizzeth-menopause symptoms.  Pt reports that she is \"super disciplined\" about everything she does, but she feels like she cannot \"solve this\" and she cannot control her emotions.  Pt reports that she does not sleep through the night despite taking Trazodone as prescribed.  She has tried watching TV, but it \"hurts,\" she has tried reading, but that also \"hurts,\" pt states that she cannot even read the newspaper because that hurts too.  Throughout the assessment, pt is tearful and visibly shaking.  Pt states that there has been excessive sadness, extreme mood changes, yelling and arguing that she is not able to get control of.  Pt reports that she has had periodic episodes of sadness, but nothing that has ever lasted this long, or been this extreme.    Brief Psychosocial History  Family:  Domestic Partnership, Children yes (Pt has two children, 18 and 15.)  Support System:  Significant Other, Parent(s), Friend  Employment Status:  employed full-time, student  Source of Income:  salary/wages  Financial Environmental Concerns:  none  Current Hobbies:  arts/crafts, exercise/fitness, family functions, reading  Barriers in Personal Life:  mental health concerns    Significant Clinical History  Current Anxiety Symptoms:  anxious, excessive worry, racing thoughts  Current Depression/Trauma:  difficulty concentrating, crying or feels like crying, irritable, sadness  Current Somatic Symptoms:  excessive worry, anxious  Current Psychosis/Thought Disturbance:  agitation  Current Eating Symptoms:     Chemical Use History:  Alcohol: None  Benzodiazepines: None  Opiates: None  Cocaine: None  Marijuana: None  Other Use: None   Past diagnosis:  Anxiety Disorder, Bipolar Disorder, Depression  Family history:  Bipolar Disorder, Depression, Anxiety Disorder  Past treatment:  Individual therapy, Psychiatric Medication " Management  Details of most recent treatment:  Pt is currently followed by psychiatry and she has a good relationship with her PCP.  Pt also started seeing a new therapist this past Thursday (9/5/240  Other relevant history:          Collateral Information  Is there collateral information: Yes     Collateral information name, relationship, phone number:  Partner, Eric Zapataenship  [phone]    What happened today: Eric states that he has never seen pt like this before.  He states that she cries uncontrollably and does not seem to be able to get herself out of it.  He states that he is an ACT worker, and if he was seeing a pt like this, he would bring them to the ED.  He reports that he has maintained his boundaries with pt meaning, he will comfort her, and listen to her but when it gets to be too much, he will distance himself.     What is different about patient's functioning: Pt has never been this sad, for this long.  He states that she is super disciplined and does everything she is supposed to do to take care of her mental health.  She cannot stop crying, and then flies into a fit and becomes argumentative.  Her mood is unpredictable, mostly extreme sadness.     Concern about alcohol/drug use:  No    What do you think the patient needs:      Has patient made comments about wanting to kill themselves/others: no    If d/c is recommended, can they take part in safety/aftercare planning:  yes    Additional collateral information:        Risk Assessment  [place] Suicide Severity Rating Scale Full Clinical Version:  Suicidal Ideation  Q6 Suicide Behavior (Lifetime): no    [place] Suicide Severity Rating Scale Recent:   Suicidal Ideation (Recent)  Q1 Wished to be Dead (Past Month): no  Q2 Suicidal Thoughts (Past Month): no  Level of Risk per Screen: no risks indicated  Intensity of Ideation (Recent)  Most Severe Ideation Rating (Past 1 Month):  (NA)  Description of Most Severe Ideation (Past 1 Month):  NA  Frequency (Past 1 Month):  (NA)  Duration (Past 1 Month):  (NA)  Controllability (Past 1 Month):  (NA)  Deterrents (Past 1 Month): Does not apply  Reasons for Ideation (Past 1 Month): Does not apply  Suicidal Behavior (Recent)  Actual Attempt (Past 3 Months): No  Total Number of Actual Attempts (Past 3 Months): 0  Has subject engaged in non-suicidal self-injurious behavior? (Past 3 Months): Yes (Pt hit herself in the head)  Interrupted Attempts (Past 3 Months): No  Total Number of Interrupted Attempts (Past 3 Months): 0 (NA)  Interrupted Attempt Description (Past 3 Months): NA  Aborted or Self-Interrupted Attempt (Past 3 Months): No  Total Number of Aborted or Self-Interrupted Attempts (Past 3 Months): 0  Aborted or Self-Interrupted Attempt Description (Past 3 Months): NA  Preparatory Acts or Behavior (Past 3 Months): No  Total Number of Preparatory Acts (Past 3 Months): 0  Preparatory Acts or Behavior Description (Past 3 Months): NA    Environmental or Psychosocial Events:    Protective Factors: Protective Factors: strong bond to family unit, community support, or employment, lives in a responsibly safe and stable environment, good treatment engagement, intact marriage or domestic partnership, responsibilities and duties to others, including pets and children, able to access care without barriers, help seeking    Does the patient have thoughts of harming others? Feels Like Hurting Others: no  Previous Attempt to Hurt Others: no  Current presentation:  (extremely sad)  Is the patient engaging in sexually inappropriate behavior?: no    Is the patient engaging in sexually inappropriate behavior?  no        Mental Status Exam   Affect: Flat  Appearance: Appropriate  Attention Span/Concentration: Attentive  Eye Contact: Variable    Fund of Knowledge: Appropriate   Language /Speech Content: Fluent  Language /Speech Volume: Soft  Language /Speech Rate/Productions: Normal  Recent Memory: Intact  Remote Memory:  "Intact  Mood: Anxious, Depressed, Sad  Orientation to Person: Yes   Orientation to Place: Yes  Orientation to Time of Day: Yes  Orientation to Date: Yes     Situation (Do they understand why they are here?): Yes  Psychomotor Behavior: Normal  Thought Content: Paranoia  Thought Form: Intact     Mini-Cog Assessment  Number of Words Recalled:    Clock-Drawing Test:     Three Item Recall:    Mini-Cog Total Score:       Medication  Psychotropic medications:   Medication Orders - Psychiatric (From admission, onward)      Start     Dose/Rate Route Frequency Ordered Stop    09/07/24 1830  LORazepam (ATIVAN) tablet 0.5 mg         0.5 mg Oral EVERY 4 HOURS PRN 09/07/24 1830               Current Care Team  Patient Care Team:  Susan Guerra MD as PCP - General  Susan Guerra MD as Assigned PCP    Diagnosis  Patient Active Problem List   Diagnosis Code    Contact Dermatitis L25.9    Insomnia G47.00    Cystic Acne L70.8    PMDD (premenstrual dysphoric disorder) F32.81    Encounter for other general counseling or advice on contraception Z30.09    Encounter for IUD insertion Z30.430    Fibroid uterus D25.9    Cyclothymic disorder F34.0    Melanoma of right posterior calf (H) C43.71    Recurrent major depressive disorder (H24) F33.9    Generalized anxiety disorder F41.1       Primary Problem This Admission  Active Hospital Problems    Recurrent major depressive disorder (H24)      Generalized anxiety disorder        Clinical Summary and Substantiation of Recommendations   Pt presents to the ED today with her partner because she has been crying uncontrollably for weeks.  She states that she cannot \"figure it out\" and she cannot stop herself.  She endorses feeling anxious and paranoid, fearful that her partner is going to leave her because of this mood change, fearful that she will be a burden to family and friends.  Pt reports that she does everything she is supposed to do to manage her mental healtlh:  she eats right, " she exercises, she works and tries to maintain work/life balance.  She notes that she is argumentative and personalizes everything between herself and her partner.  She denies SI stating that she would never do that to her kids.  Pt does not endorse HI/SIB although she does admit that she has hit herself in the head because she gets so frustrated with herself for being unable to pull herself out of this mood.  Pt denies AH/VH.  She reports that she will be safe at home and is willing to safety plan for emotional concerns as opposed to SI.  Pt commits to following up with her psychiatrist on Monday, and if he is unavailable, she will follow up with her PCP with whom she has a good relationship.  Pt also reports that she has a new therapist, with whom she is scheduled to meet on Thursday of next week.  Pt's partner was present for the entire assessment, and is also willing to do what it takes to keep her safe until she can be seen.  Given that she does not endorse SI/HI/SIB she does not meet criteria for admission.  Her ability/willingness to safety plan, and future appointments already in place, discharge seems appropriate.      Patient coping skills attempted to reduce the crisis:  Pt has tried reading, watching TV, talking to friends.  Pt's partner brought her to the ED    Disposition  Recommended disposition: Individual Therapy, Medication Management, Other. please comment (Pt to follow up with Psychiatric provider on Monday.  If unable to be seen, follow up with PCP for possible medication adjustment.  Also follow through with appointment with therapist on Thursday of next week.)        Reviewed case and recommendations with attending provider. Attending Name: Dr. Russ Vega       Attending concurs with disposition: yes       Patient and/or validated legal guardian concurs with disposition:   yes       Final disposition:  discharge    Legal status on admission:      Assessment Details   Total duration spent  with the patient: 55 min     CPT code(s) utilized: 48942 - Psychotherapy for Crisis - 60 (30-74*) min    TATIANA Ward, Psychotherapist  [facility] - Triage & Transition Services  Callback: [phone]

## 2024-09-08 NOTE — TELEPHONE ENCOUNTER
Triage and Transition Services- Patient Follow Up Call  Service Line Making Phone Call: Telemedicine    Who did Writer Talk to: NA    Details of Call: LVM with DEC queue phone number.    Sandra Solorio 9/8/2024 2:49 PM

## 2024-09-09 ENCOUNTER — TELEPHONE (OUTPATIENT)
Dept: BEHAVIORAL HEALTH | Facility: CLINIC | Age: 49
End: 2024-09-09
Payer: COMMERCIAL

## 2024-09-09 ENCOUNTER — PATIENT OUTREACH (OUTPATIENT)
Dept: INTERNAL MEDICINE | Facility: CLINIC | Age: 49
End: 2024-09-09
Payer: COMMERCIAL

## 2024-09-09 NOTE — TELEPHONE ENCOUNTER
Transitions of Care Outreach  Chief Complaint   Patient presents with    Hospital F/U       Most Recent Admission Date: 9/7/2024   Most Recent Admission Diagnosis:      Most Recent Discharge Date: 9/7/2024   Most Recent Discharge Diagnosis: Depression, unspecified depression type - F32.A     Transitions of Care Assessment    Discharge Assessment  How are you doing now that you are home?: Papito set me up with someone to look at my meds, I do have a brand new therapist that I like so I'll see her on Thursday. I set up some support with family and friends. It started this summer and it just has gotten worse but part of it was an avalanche because I kept digging myself a hole of shame. I didn't get admitted at least, they set me up with a plan. I [facility] and I did contact them to see if they can raise the estrogen. I feel safe at home. My partner...going to the ER and hearing the story over and over again was really good for him understand. No thoughts of hurting myself or anyone else.  How are your symptoms? (Red Flag symptoms escalate to triage hotline per guidelines): Improved  Do you know how to contact your clinic care team if you have future questions or changes to your health status? : Yes  Does the patient have their discharge instructions? : Yes  Does the patient have questions regarding their discharge instructions? : No  Were you started on any new medications or were there changes to any of your previous medications? : No  Does the patient have all of their medications?: Yes  Do you have questions regarding any of your medications? : No  Do you have all of your needed medical supplies or equipment (DME)?  (i.e. oxygen tank, CPAP, cane, etc.): Yes    Follow up Plan     Discharge Follow-Up  Discharge follow up appointment scheduled in alignment with recommended follow up timeframe or Transitions of Risk Category? (Low = within 30 days; Moderate= within 14 days; High=  within 7 days): Yes    Future Appointments   Date Time Provider Department Center   9/19/2024  5:00 PM Susan Guerra MD [facility]       Outpatient Plan as outlined on AVS reviewed with patient.    For any urgent concerns, please contact our 24 hour nurse triage line: [phone] ([phone])       Rochelle Easton RN

## 2024-09-19 ENCOUNTER — MYC REFILL (OUTPATIENT)
Dept: INTERNAL MEDICINE | Facility: CLINIC | Age: 49
End: 2024-09-19

## 2024-09-19 DIAGNOSIS — F41.9 ANXIETY: ICD-10-CM

## 2024-09-20 RX ORDER — LORAZEPAM 0.5 MG/1
0.5 TABLET ORAL DAILY
Qty: 20 TABLET | Refills: 0 | Status: SHIPPED | OUTPATIENT
Start: 2024-09-20

## 2024-10-09 PROCEDURE — 87624 HPV HI-RISK TYP POOLED RSLT: CPT | Mod: ORL | Performed by: STUDENT IN AN ORGANIZED HEALTH CARE EDUCATION/TRAINING PROGRAM

## 2024-10-09 PROCEDURE — G0145 SCR C/V CYTO,THINLAYER,RESCR: HCPCS | Mod: ORL | Performed by: STUDENT IN AN ORGANIZED HEALTH CARE EDUCATION/TRAINING PROGRAM

## 2024-10-10 ENCOUNTER — LAB REQUISITION (OUTPATIENT)
Dept: LAB | Facility: CLINIC | Age: 49
End: 2024-10-10
Payer: COMMERCIAL

## 2024-10-10 DIAGNOSIS — Z12.4 ENCOUNTER FOR SCREENING FOR MALIGNANT NEOPLASM OF CERVIX: ICD-10-CM

## 2024-10-11 LAB
HPV HR 12 DNA CVX QL NAA+PROBE: NEGATIVE
HPV16 DNA CVX QL NAA+PROBE: NEGATIVE
HPV18 DNA CVX QL NAA+PROBE: NEGATIVE
HUMAN PAPILLOMA VIRUS FINAL DIAGNOSIS: NORMAL

## 2024-10-16 LAB
BKR AP ASSOCIATED HPV REPORT: NORMAL
BKR LAB AP GYN ADEQUACY: NORMAL
BKR LAB AP GYN INTERPRETATION: NORMAL
BKR LAB AP LMP: NORMAL
BKR LAB AP PREVIOUS ABNL DX: NORMAL
BKR LAB AP PREVIOUS ABNORMAL: NORMAL
PATH REPORT.COMMENTS IMP SPEC: NORMAL
PATH REPORT.COMMENTS IMP SPEC: NORMAL
PATH REPORT.RELEVANT HX SPEC: NORMAL

## 2024-12-26 ENCOUNTER — PATIENT OUTREACH (OUTPATIENT)
Dept: CARE COORDINATION | Facility: CLINIC | Age: 49
End: 2024-12-26
Payer: COMMERCIAL

## 2025-05-06 ENCOUNTER — PATIENT OUTREACH (OUTPATIENT)
Dept: CARE COORDINATION | Facility: CLINIC | Age: 50
End: 2025-05-06
Payer: COMMERCIAL

## 2025-05-31 DIAGNOSIS — F31.81 MILD HYPOMANIC BIPOLAR II DISORDER (H): ICD-10-CM

## 2025-06-02 RX ORDER — LAMOTRIGINE 150 MG/1
150 TABLET ORAL DAILY
Qty: 90 TABLET | Refills: 2 | Status: SHIPPED | OUTPATIENT
Start: 2025-06-02

## 2025-08-06 ENCOUNTER — OFFICE VISIT (OUTPATIENT)
Dept: INTERNAL MEDICINE | Facility: CLINIC | Age: 50
End: 2025-08-06
Payer: COMMERCIAL

## 2025-08-06 VITALS
SYSTOLIC BLOOD PRESSURE: 99 MMHG | RESPIRATION RATE: 14 BRPM | BODY MASS INDEX: 20.37 KG/M2 | HEART RATE: 56 BPM | DIASTOLIC BLOOD PRESSURE: 65 MMHG | TEMPERATURE: 98.1 F | OXYGEN SATURATION: 98 % | HEIGHT: 65 IN | WEIGHT: 122.3 LBS

## 2025-08-06 DIAGNOSIS — E78.5 HYPERLIPIDEMIA LDL GOAL <100: ICD-10-CM

## 2025-08-06 DIAGNOSIS — E55.9 VITAMIN D DEFICIENCY: ICD-10-CM

## 2025-08-06 DIAGNOSIS — F31.81 MILD HYPOMANIC BIPOLAR II DISORDER (H): ICD-10-CM

## 2025-08-06 DIAGNOSIS — Z00.00 ROUTINE GENERAL MEDICAL EXAMINATION AT A HEALTH CARE FACILITY: Primary | ICD-10-CM

## 2025-08-06 DIAGNOSIS — C43.71: ICD-10-CM

## 2025-08-06 LAB
BASOPHILS # BLD AUTO: 0 10E3/UL (ref 0–0.2)
BASOPHILS NFR BLD AUTO: 0 %
EOSINOPHIL # BLD AUTO: 0 10E3/UL (ref 0–0.7)
EOSINOPHIL NFR BLD AUTO: 1 %
ERYTHROCYTE [DISTWIDTH] IN BLOOD BY AUTOMATED COUNT: 12.3 % (ref 10–15)
HCT VFR BLD AUTO: 41.8 % (ref 35–47)
HGB BLD-MCNC: 13.5 G/DL (ref 11.7–15.7)
IMM GRANULOCYTES # BLD: 0 10E3/UL
IMM GRANULOCYTES NFR BLD: 0 %
LYMPHOCYTES # BLD AUTO: 1.5 10E3/UL (ref 0.8–5.3)
LYMPHOCYTES NFR BLD AUTO: 26 %
MCH RBC QN AUTO: 30 PG (ref 26.5–33)
MCHC RBC AUTO-ENTMCNC: 32.3 G/DL (ref 31.5–36.5)
MCV RBC AUTO: 93 FL (ref 78–100)
MONOCYTES # BLD AUTO: 0.3 10E3/UL (ref 0–1.3)
MONOCYTES NFR BLD AUTO: 5 %
NEUTROPHILS # BLD AUTO: 3.9 10E3/UL (ref 1.6–8.3)
NEUTROPHILS NFR BLD AUTO: 68 %
PLATELET # BLD AUTO: 229 10E3/UL (ref 150–450)
RBC # BLD AUTO: 4.5 10E6/UL (ref 3.8–5.2)
WBC # BLD AUTO: 5.7 10E3/UL (ref 4–11)

## 2025-08-06 PROCEDURE — 3078F DIAST BP <80 MM HG: CPT | Performed by: INTERNAL MEDICINE

## 2025-08-06 PROCEDURE — 82306 VITAMIN D 25 HYDROXY: CPT | Performed by: INTERNAL MEDICINE

## 2025-08-06 PROCEDURE — 90677 PCV20 VACCINE IM: CPT | Performed by: INTERNAL MEDICINE

## 2025-08-06 PROCEDURE — 90471 IMMUNIZATION ADMIN: CPT | Performed by: INTERNAL MEDICINE

## 2025-08-06 PROCEDURE — G2211 COMPLEX E/M VISIT ADD ON: HCPCS | Performed by: INTERNAL MEDICINE

## 2025-08-06 PROCEDURE — 99214 OFFICE O/P EST MOD 30 MIN: CPT | Mod: 25 | Performed by: INTERNAL MEDICINE

## 2025-08-06 PROCEDURE — 80061 LIPID PANEL: CPT | Performed by: INTERNAL MEDICINE

## 2025-08-06 PROCEDURE — 85025 COMPLETE CBC W/AUTO DIFF WBC: CPT | Performed by: INTERNAL MEDICINE

## 2025-08-06 PROCEDURE — 99396 PREV VISIT EST AGE 40-64: CPT | Mod: 25 | Performed by: INTERNAL MEDICINE

## 2025-08-06 PROCEDURE — 36415 COLL VENOUS BLD VENIPUNCTURE: CPT | Performed by: INTERNAL MEDICINE

## 2025-08-06 PROCEDURE — 80053 COMPREHEN METABOLIC PANEL: CPT | Performed by: INTERNAL MEDICINE

## 2025-08-06 PROCEDURE — 3074F SYST BP LT 130 MM HG: CPT | Performed by: INTERNAL MEDICINE

## 2025-08-06 PROCEDURE — 1126F AMNT PAIN NOTED NONE PRSNT: CPT | Performed by: INTERNAL MEDICINE

## 2025-08-06 PROCEDURE — 86706 HEP B SURFACE ANTIBODY: CPT | Performed by: INTERNAL MEDICINE

## 2025-08-06 PROCEDURE — 84443 ASSAY THYROID STIM HORMONE: CPT | Performed by: INTERNAL MEDICINE

## 2025-08-06 SDOH — HEALTH STABILITY: PHYSICAL HEALTH: ON AVERAGE, HOW MANY MINUTES DO YOU ENGAGE IN EXERCISE AT THIS LEVEL?: 60 MIN

## 2025-08-06 SDOH — HEALTH STABILITY: PHYSICAL HEALTH: ON AVERAGE, HOW MANY DAYS PER WEEK DO YOU ENGAGE IN MODERATE TO STRENUOUS EXERCISE (LIKE A BRISK WALK)?: 5 DAYS

## 2025-08-06 ASSESSMENT — PAIN SCALES - GENERAL: PAINLEVEL_OUTOF10: NO PAIN (0)

## 2025-08-06 ASSESSMENT — SOCIAL DETERMINANTS OF HEALTH (SDOH): HOW OFTEN DO YOU GET TOGETHER WITH FRIENDS OR RELATIVES?: TWICE A WEEK

## 2025-08-06 ASSESSMENT — PATIENT HEALTH QUESTIONNAIRE - PHQ9
SUM OF ALL RESPONSES TO PHQ QUESTIONS 1-9: 4
SUM OF ALL RESPONSES TO PHQ QUESTIONS 1-9: 4
10. IF YOU CHECKED OFF ANY PROBLEMS, HOW DIFFICULT HAVE THESE PROBLEMS MADE IT FOR YOU TO DO YOUR WORK, TAKE CARE OF THINGS AT HOME, OR GET ALONG WITH OTHER PEOPLE: NOT DIFFICULT AT ALL

## 2025-08-07 LAB
ALBUMIN SERPL BCG-MCNC: 4.7 G/DL (ref 3.5–5.2)
ALP SERPL-CCNC: 74 U/L (ref 40–150)
ALT SERPL W P-5'-P-CCNC: 10 U/L (ref 0–50)
ANION GAP SERPL CALCULATED.3IONS-SCNC: 9 MMOL/L (ref 7–15)
AST SERPL W P-5'-P-CCNC: 20 U/L (ref 0–45)
BILIRUB SERPL-MCNC: 0.3 MG/DL
BUN SERPL-MCNC: 16.6 MG/DL (ref 6–20)
CALCIUM SERPL-MCNC: 9.7 MG/DL (ref 8.8–10.4)
CHLORIDE SERPL-SCNC: 101 MMOL/L (ref 98–107)
CHOLEST SERPL-MCNC: 250 MG/DL
CREAT SERPL-MCNC: 0.77 MG/DL (ref 0.51–0.95)
EGFRCR SERPLBLD CKD-EPI 2021: >90 ML/MIN/1.73M2
FASTING STATUS PATIENT QL REPORTED: NO
FASTING STATUS PATIENT QL REPORTED: NO
GLUCOSE SERPL-MCNC: 94 MG/DL (ref 70–99)
HBV SURFACE AB SERPL IA-ACNC: >1000 M[IU]/ML
HBV SURFACE AB SERPL IA-ACNC: REACTIVE M[IU]/ML
HCO3 SERPL-SCNC: 28 MMOL/L (ref 22–29)
HDLC SERPL-MCNC: 80 MG/DL
LDLC SERPL CALC-MCNC: 149 MG/DL
NONHDLC SERPL-MCNC: 170 MG/DL
POTASSIUM SERPL-SCNC: 4 MMOL/L (ref 3.4–5.3)
PROT SERPL-MCNC: 7.3 G/DL (ref 6.4–8.3)
SODIUM SERPL-SCNC: 138 MMOL/L (ref 135–145)
TRIGL SERPL-MCNC: 106 MG/DL
TSH SERPL DL<=0.005 MIU/L-ACNC: 0.76 UIU/ML (ref 0.3–4.2)
VIT D+METAB SERPL-MCNC: 53 NG/ML (ref 20–50)

## 2025-08-25 ENCOUNTER — MYC MEDICAL ADVICE (OUTPATIENT)
Dept: INTERNAL MEDICINE | Facility: CLINIC | Age: 50
End: 2025-08-25
Payer: COMMERCIAL

## 2025-08-25 DIAGNOSIS — F41.9 ANXIETY: Primary | ICD-10-CM

## 2025-08-25 RX ORDER — HYDROXYZINE HYDROCHLORIDE 25 MG/1
25 TABLET, FILM COATED ORAL 2 TIMES DAILY PRN
Qty: 30 TABLET | Refills: 0 | Status: SHIPPED | OUTPATIENT
Start: 2025-08-25